# Patient Record
Sex: FEMALE | Race: WHITE | Employment: OTHER | URBAN - METROPOLITAN AREA
[De-identification: names, ages, dates, MRNs, and addresses within clinical notes are randomized per-mention and may not be internally consistent; named-entity substitution may affect disease eponyms.]

---

## 2017-08-29 ENCOUNTER — OFFICE VISIT (OUTPATIENT)
Dept: CARDIOLOGY CLINIC | Age: 72
End: 2017-08-29

## 2017-08-29 VITALS
HEIGHT: 63 IN | SYSTOLIC BLOOD PRESSURE: 110 MMHG | DIASTOLIC BLOOD PRESSURE: 60 MMHG | BODY MASS INDEX: 25.76 KG/M2 | HEART RATE: 56 BPM | WEIGHT: 145.4 LBS

## 2017-08-29 DIAGNOSIS — I25.119 CORONARY ARTERY DISEASE WITH ANGINA PECTORIS, UNSPECIFIED VESSEL OR LESION TYPE, UNSPECIFIED WHETHER NATIVE OR TRANSPLANTED HEART (HCC): ICD-10-CM

## 2017-08-29 DIAGNOSIS — E78.89 LIPIDS ABNORMAL: Primary | ICD-10-CM

## 2017-08-29 RX ORDER — PRAVASTATIN SODIUM 20 MG/1
20 TABLET ORAL
COMMUNITY
End: 2017-08-30 | Stop reason: SDUPTHER

## 2017-08-29 RX ORDER — PRAVASTATIN SODIUM 10 MG/1
10 TABLET ORAL
COMMUNITY
End: 2017-11-15 | Stop reason: DRUGHIGH

## 2017-08-29 NOTE — PATIENT INSTRUCTIONS
Increase Pravachol 30 mg daily    Have blood work drawn in 2 months    Follow up with Doug Vaca in 1 year

## 2017-08-29 NOTE — MR AVS SNAPSHOT
Visit Information Date & Time Provider Department Dept. Phone Encounter #  
 8/29/2017 11:40 AM Julissa Figueroa MD [facility] Fahad Wiggins [phone] [id number] Upcoming Health Maintenance Date Due Hepatitis C Screening 1945 DTaP/Tdap/Td series (1 - Tdap) 8/27/1968 FOBT Q 1 YEAR AGE 50-75 8/27/1997 ZOSTER VACCINE AGE 60> 6/27/2007 GLAUCOMA SCREENING Q2Y 8/27/2012 OSTEOPOROSIS SCREENING (DEXA) 8/27/2012 Pneumococcal 65+ Low/Medium Risk (1 of 2 - PCV13) 8/27/2012 MEDICARE YEARLY EXAM 8/27/2012 INFLUENZA AGE 9 TO ADULT 8/1/2017 BREAST CANCER SCRN MAMMOGRAM 12/13/2018 Allergies as of 8/29/2017  Review Complete On: 8/29/2017 By: Julissa Figueroa MD  
 No Known Allergies Current Immunizations  Never Reviewed No immunizations on file. Not reviewed this visit You Were Diagnosed With   
  
 Codes Comments Lipids abnormal    -  Primary ICD-10-CM: E78.89 ICD-9-CM: 272.9 Coronary artery disease with angina pectoris, unspecified vessel or lesion type, unspecified whether native or transplanted heart [facility])     ICD-10-CM: I25.119 ICD-9-CM: 414.00, 413.9 Vitals BP Pulse Height(growth percentile) Weight(growth percentile) BMI OB Status 110/60 (BP 1 Location: Left arm, BP Patient Position: Sitting) (!) 56 5' 3\" (1.6 m) 145 lb 6.4 oz (66 kg) 25.76 kg/m2 Postmenopausal  
 Smoking Status Former Smoker BMI and BSA Data Body Mass Index Body Surface Area 25.76 kg/m 2 1.71 m 2 Preferred Pharmacy Pharmacy Name Phone [facility], [facility] Nisreen Marquez [phone] Your Updated Medication List  
  
   
This list is accurate as of: 8/29/17  1:12 PM.  Always use your most recent med list.  
  
  
  
  
 ALEVE 220 mg Cap Generic drug:  naproxen sodium Take  by mouth as directed. Citracal + D tablet Generic drug:  calcium citrate-vitamin D3 Take  by mouth daily. CLARITIN 10 mg tablet Generic drug:  loratadine Take 10 mg by mouth daily. FISH OIL 1,000 mg Cap Generic drug:  omega-3 fatty acids-vitamin e Take 1 Cap by mouth.  
  
 multivitamin tablet Commonly known as:  ONE A DAY Take 1 Tab by mouth daily. * pravastatin 20 mg tablet Commonly known as:  PRAVACHOL Take 20 mg by mouth nightly. * pravastatin 10 mg tablet Commonly known as:  PRAVACHOL Take 10 mg by mouth nightly. VESIcare 5 mg tablet Generic drug:  solifenacin Take 5 mg by mouth daily. * Notice: This list has 2 medication(s) that are the same as other medications prescribed for you. Read the directions carefully, and ask your doctor or other care provider to review them with you. We Performed the Following AMB POC EKG ROUTINE W/ 12 LEADS, INTER & REP [17227 CPT(R)] HEPATIC FUNCTION PANEL [96824 CPT(R)] LIPID PANEL [35690 CPT(R)] Patient Instructions Increase Pravachol 30 mg daily Have blood work drawn in 2 months Follow up with Frankie Yang in 1 year Introducing [facility] & HEALTH SERVICES! Victorina Mariee introduces [employer] patient portal. Now you can access parts of your medical record, email your doctor's office, and request medication refills online. 1. In your internet browser, go to [email or web address] 2. Click on the First Time User? Click Here link in the Sign In box. You will see the New Member Sign Up page. 3. Enter your [employer] Access Code exactly as it appears below. You will not need to use this code after youve completed the sign-up process. If you do not sign up before the expiration date, you must request a new code. · [employer] Access Code: [email or web address] Expires: 11/13/2017  8:08 AM 
 
4.  Enter the last four digits of your Social Security Number (xxxx) and Date of Birth (mm/dd/yyyy) as indicated and click Submit. You will be taken to the next sign-up page. 5. Create a [employer] ID. This will be your [employer] login ID and cannot be changed, so think of one that is secure and easy to remember. 6. Create a [employer] password. You can change your password at any time. 7. Enter your Password Reset Question and Answer. This can be used at a later time if you forget your password. 8. Enter your e-mail address. You will receive e-mail notification when new information is available in [address] 9. Click Sign Up. You can now view and download portions of your medical record. 10. Click the Download Summary menu link to download a portable copy of your medical information. If you have questions, please visit the Frequently Asked Questions section of the [employer] website. Remember, [employer] is NOT to be used for urgent needs. For medical emergencies, dial 911. Now available from your iPhone and Android! Please provide this summary of care documentation to your next provider. Your primary care clinician is listed as ADRIANNE Ferreira 49. If you have any questions after today's visit, please call [phone].

## 2017-08-29 NOTE — PROGRESS NOTES
HISTORY OF PRESENT ILLNESS  Ratna Miranda is a 81 y.o. female. She has no h/o HTN, DM she does have bordeline increase in cholesterol. Here for elevated ca score  Ca score on :  Left main: 0  Left anterior descendin  Left circumflex: 0  Right coronary: 0  Posterior descendin    Total calcium score: 128  Stress echo on 10/12 no ischemia or mi and EF at rest 60%  PMH: urinary incontinence  PSH : as above  SH: no tobacco, quit in , occasional etoh, retired  FH: not significant for early CAD  HPI  She is completely asymptomatic and doing well  Review of Systems   Constitutional: Negative. Respiratory: Negative. Cardiovascular: Negative. Physical Exam   Physical Exam   Blood pressure 110/60, pulse (!) 56, height 5' 3\" (1.6 m), weight 66 kg (145 lb 6.4 oz). Constitutional: She is oriented to person, place, and time. She appears well-developed and well-nourished. No distress. HENT: Head: Normocephalic. Eyes: No scleral icterus. Neck: Normal range of motion. Neck supple. No JVD present. No tracheal deviation present. Cardiovascular: Normal rate, regular rhythm, normal heart sounds and intact distal pulses. Exam reveals no gallop and no friction rub. No murmur heard. Pulmonary/Chest: Effort normal and breath sounds normal. No stridor. No respiratory distress, wheezes or  rales. Abdominal: She exhibits no distension. Musculoskeletal: She exhibits no edema. Neurological: She is alert and oriented to person, place, and time. Coordination normal.   Skin: Skin is warm. No rash noted. Not diaphoretic. No erythema. Psychiatric:  Normal mood and affect. Behavior is normal.   Current Outpatient Prescriptions on File Prior to Visit   Medication Sig Dispense Refill    pravastatin (PRAVACHOL) 20 mg tablet TAKE 1 TABLET BY MOUTH NIGHTLY 90 Tab 2    omega-3 fatty acids-vitamin e (FISH OIL) 1,000 mg cap Take 1 Cap by mouth.       loratadine (CLARITIN) 10 mg tablet Take 10 mg by mouth daily.      solifenacin (VESICARE) 5 mg tablet Take 5 mg by mouth daily.  multivitamin (ONE A DAY) tablet Take 1 Tab by mouth daily.  calcium citrate-vitamin D3 (CITRACAL + D) tablet Take  by mouth daily.  naproxen sodium (ALEVE) 220 mg Cap Take  by mouth as directed. No current facility-administered medications on file prior to visit. Lab Results   Component Value Date/Time    Cholesterol, total 166 08/18/2017 08:09 AM    Cholesterol, Total 170 08/18/2017 08:09 AM    HDL Cholesterol 66 08/18/2017 08:09 AM    LDL, calculated 86 08/18/2017 08:09 AM    VLDL, calculated 14 08/18/2017 08:09 AM    Triglyceride 68 08/18/2017 08:09 AM     Results for orders placed or performed in visit on 08/10/17   NMR LIPOPROFILE     Status: Abnormal   Result Value Ref Range Status    LDL-P 1031 (H) <1000 nmol/L Final     Comment:                           Low                   < 1000                            Moderate         1000 - 1299                            Borderline-High  1300 - 1599                            High             1600 - 2000                            Very High             > 2000      LDL-C 89 0 - 99 mg/dL Final     Comment:                           Optimal               <  100                            Above optimal     100 -  129                            Borderline        130 -  159                            High              160 -  189                            Very high             >  189  LDL-C is inaccurate if patient is non-fasting.       HDL-C 66 >39 mg/dL Final    Triglycerides 74 0 - 149 mg/dL Final    Cholesterol, Total 170 100 - 199 mg/dL Final    HDL-P (Total) 42.9 >=30.5 umol/L Final    Small LDL-P 556 (H) <=527 nmol/L Final    LDL size 20.8 >20.5 nm Final     Comment:  ----------------------------------------------------------                   ** INTERPRETATIVE INFORMATION**                   PARTICLE CONCENTRATION AND SIZE                      <--Lower CVD Risk   Higher CVD Risk-->    LDL AND HDL PARTICLES   Percentile in Reference Population    HDL-P (total)        High     75th    50th    25th   Low                         >34.9    34.9    30.5    26.7   <26.7    Small LDL-P          Low      25th    50th    75th   High                         <117     117     527     839    >839    LDL Size   <-Large (Pattern A)->    <-Small (Pattern B)->                      23.0    20.6           20.5      19.0   ----------------------------------------------------------  Small LDL-P and LDL Size are associated with CVD risk, but not after  LDL-P is taken into account. These assays were developed and their performance characteristics  determined by [facility]. These assays have not been cleared by the  [employer] and Drug Administration. The clinical utility o  f these  laboratory values have not been fully established. LP-IR SCORE 32 <=45 Final     Comment: INSULIN RESISTANCE MARKER      <--Insulin Sensitive    Insulin Resistant-->             Percentile in Reference Population  Insulin Resistance Score  LP-IR Score   Low   25th   50th   75th   High                <27   27     45     63     >63  LP-IR Score is inaccurate if patient is non-fasting. The LP-IR score is a laboratory developed index that has been  associated with insulin resistance and diabetes risk and should be  used as one component of a physician's clinical assessment. The  LP-IR score listed above has not been cleared by the [employer] and  Drug Administration. Narrative    Performed at:  [address]  [id number]  : Vini Izaguirre MD, Phone:  [phone]       ASSESSMENT and PLAN  CAD: subclinical by ca score. Her ECg is unchanged with NSR and no significant st t changes. She is completely asymptomatic. No stress test for now, consider stress echo at the next office visit.  Continue same medications  HLD: still with ldl>70 discussed options, increase pravachol to 30 mg daily and obtain lipids and lft in 2 months  have discussed and encouraged weight loss and exercise as well and told her that she needs to exercise at least 3-4 times a week  HTN: well controlled  See her back in 1 year unless of occurring issues

## 2017-08-31 RX ORDER — PRAVASTATIN SODIUM 20 MG/1
20 TABLET ORAL
Qty: 90 TAB | Refills: 3 | Status: SHIPPED | OUTPATIENT
Start: 2017-08-31 | End: 2017-11-15 | Stop reason: DRUGHIGH

## 2017-11-11 LAB
ALBUMIN SERPL-MCNC: 4.2 G/DL (ref 3.5–4.8)
ALP SERPL-CCNC: 68 IU/L (ref 39–117)
ALT SERPL-CCNC: 14 IU/L (ref 0–32)
AST SERPL-CCNC: 20 IU/L (ref 0–40)
BILIRUB DIRECT SERPL-MCNC: 0.19 MG/DL (ref 0–0.4)
BILIRUB SERPL-MCNC: 0.8 MG/DL (ref 0–1.2)
CHOLEST SERPL-MCNC: 164 MG/DL (ref 100–199)
HDLC SERPL-MCNC: 67 MG/DL
INTERPRETATION, 910389: NORMAL
LDLC SERPL CALC-MCNC: 81 MG/DL (ref 0–99)
PROT SERPL-MCNC: 6.9 G/DL (ref 6–8.5)
TRIGL SERPL-MCNC: 78 MG/DL (ref 0–149)
VLDLC SERPL CALC-MCNC: 16 MG/DL (ref 5–40)

## 2017-11-13 ENCOUNTER — TELEPHONE (OUTPATIENT)
Dept: CARDIOLOGY CLINIC | Age: 72
End: 2017-11-13

## 2017-11-13 NOTE — TELEPHONE ENCOUNTER
Verified patient with two patient identifiers. Spoke with patient lab results given. Per Jaimee Messina continue same meds and repeat FLP in 6 months. Will fax a new Rx to [facility]. Per Dr Kj Murcia VO discontinue all medication not taken.

## 2017-11-15 RX ORDER — PRAVASTATIN SODIUM 20 MG/1
20 TABLET ORAL
COMMUNITY
End: 2017-11-15 | Stop reason: SDUPTHER

## 2017-11-15 RX ORDER — PRAVASTATIN SODIUM 20 MG/1
TABLET ORAL
Qty: 135 TAB | Refills: 2 | Status: SHIPPED | OUTPATIENT
Start: 2017-11-15 | End: 2018-02-06 | Stop reason: SDUPTHER

## 2017-12-13 ENCOUNTER — HOSPITAL ENCOUNTER (OUTPATIENT)
Dept: MAMMOGRAPHY | Age: 72
Discharge: HOME | End: 2017-12-13
Payer: COMMERCIAL

## 2017-12-13 DIAGNOSIS — Z12.31 VISIT FOR SCREENING MAMMOGRAM: ICD-10-CM

## 2017-12-13 PROCEDURE — 77067 SCR MAMMO BI INCL CAD: CPT

## 2018-02-06 RX ORDER — PRAVASTATIN SODIUM 20 MG/1
30 TABLET ORAL
Qty: 135 TAB | Refills: 1 | Status: SHIPPED | OUTPATIENT
Start: 2018-02-06 | End: 2018-02-06 | Stop reason: SDUPTHER

## 2018-02-06 RX ORDER — PRAVASTATIN SODIUM 20 MG/1
TABLET ORAL
Qty: 135 TAB | Refills: 3 | Status: SHIPPED | OUTPATIENT
Start: 2018-02-06 | End: 2018-10-03 | Stop reason: DRUGHIGH

## 2018-02-06 NOTE — TELEPHONE ENCOUNTER
Requested Prescriptions     Signed Prescriptions Disp Refills    pravastatin (PRAVACHOL) 20 mg tablet 135 Tab 1     Sig: Take 1.5 Tabs by mouth nightly. 1 1/2 tablets at bedtime     Authorizing Provider: Agustin Pearce     Ordering User: Elise Zamudio     Verbal order per Dr. Osmin Lovett. Yearly follow up visit scheduled on 8-31-18.

## 2018-04-24 ENCOUNTER — HOSPITAL ENCOUNTER (OUTPATIENT)
Dept: ULTRASOUND IMAGING | Age: 73
Discharge: HOME | End: 2018-04-24
Attending: FAMILY MEDICINE
Payer: MEDICARE

## 2018-04-24 DIAGNOSIS — R22.1 NECK MASS: ICD-10-CM

## 2018-04-24 PROCEDURE — 76536 US EXAM OF HEAD AND NECK: CPT

## 2018-05-07 ENCOUNTER — HOSPITAL ENCOUNTER (OUTPATIENT)
Dept: ULTRASOUND IMAGING | Age: 73
Discharge: HOME | End: 2018-05-07
Attending: OTOLARYNGOLOGY
Payer: MEDICARE

## 2018-05-07 DIAGNOSIS — R22.0 INTRACRANIAL SWELLING: ICD-10-CM

## 2018-05-07 PROCEDURE — 10022 US GUIDE FINE NDL ASP W IMAGE: CPT

## 2018-05-07 PROCEDURE — 88173 CYTOPATH EVAL FNA REPORT: CPT | Performed by: OTOLARYNGOLOGY

## 2018-05-07 PROCEDURE — 88172 CYTP DX EVAL FNA 1ST EA SITE: CPT | Performed by: OTOLARYNGOLOGY

## 2018-08-10 ENCOUNTER — HOSPITAL ENCOUNTER (OUTPATIENT)
Dept: GENERAL RADIOLOGY | Age: 73
Discharge: HOME | End: 2018-08-10
Payer: MEDICARE

## 2018-08-10 DIAGNOSIS — Z87.891 PERSONAL HISTORY OF TOBACCO USE, PRESENTING HAZARDS TO HEALTH: ICD-10-CM

## 2018-08-10 PROCEDURE — 71046 X-RAY EXAM CHEST 2 VIEWS: CPT

## 2018-09-12 ENCOUNTER — TELEPHONE (OUTPATIENT)
Dept: CARDIOLOGY CLINIC | Age: 73
End: 2018-09-12

## 2018-09-12 DIAGNOSIS — E78.89 LIPIDS ABNORMAL: ICD-10-CM

## 2018-09-12 DIAGNOSIS — I25.119 CORONARY ARTERY DISEASE WITH ANGINA PECTORIS, UNSPECIFIED VESSEL OR LESION TYPE, UNSPECIFIED WHETHER NATIVE OR TRANSPLANTED HEART (HCC): Primary | ICD-10-CM

## 2018-09-12 NOTE — TELEPHONE ENCOUNTER
Verified patient with two patient identifiers. Spoke with patient,she said that she will have it done sometime next week. Lab slip faxed to [phone].

## 2018-09-12 NOTE — TELEPHONE ENCOUNTER
Pt called requesting an lab order be faxed to Pam Russo in prep for her to get her labs drawn.   Fax # [phone]  Phone # [phone]  Thanks

## 2018-09-21 ENCOUNTER — HOSPITAL ENCOUNTER (OUTPATIENT)
Dept: LAB | Age: 73
Discharge: HOME | End: 2018-09-21
Payer: MEDICARE

## 2018-09-21 PROCEDURE — 80076 HEPATIC FUNCTION PANEL: CPT

## 2018-09-21 PROCEDURE — 80061 LIPID PANEL: CPT

## 2018-09-21 PROCEDURE — 36415 COLL VENOUS BLD VENIPUNCTURE: CPT

## 2018-09-22 LAB
ALBUMIN SERPL-MCNC: 3.9 G/DL (ref 3.5–4.8)
ALP SERPL-CCNC: 60 IU/L (ref 39–117)
ALT SERPL-CCNC: 17 IU/L (ref 0–32)
AST SERPL-CCNC: 18 IU/L (ref 0–40)
BILIRUB DIRECT SERPL-MCNC: 0.18 MG/DL (ref 0–0.4)
BILIRUB SERPL-MCNC: 0.7 MG/DL (ref 0–1.2)
CHOLEST SERPL-MCNC: 158 MG/DL (ref 100–199)
HDLC SERPL-MCNC: 60 MG/DL
INTERPRETATION, 910389: NORMAL
LDLC SERPL CALC-MCNC: 79 MG/DL (ref 0–99)
PROT SERPL-MCNC: 6.6 G/DL (ref 6–8.5)
TRIGL SERPL-MCNC: 94 MG/DL (ref 0–149)
VLDLC SERPL CALC-MCNC: 19 MG/DL (ref 5–40)

## 2018-10-03 ENCOUNTER — OFFICE VISIT (OUTPATIENT)
Dept: CARDIOLOGY CLINIC | Age: 73
End: 2018-10-03

## 2018-10-03 VITALS
HEIGHT: 63 IN | HEART RATE: 64 BPM | SYSTOLIC BLOOD PRESSURE: 140 MMHG | BODY MASS INDEX: 26.4 KG/M2 | DIASTOLIC BLOOD PRESSURE: 84 MMHG | WEIGHT: 149 LBS

## 2018-10-03 DIAGNOSIS — E78.89 LIPIDS ABNORMAL: Primary | ICD-10-CM

## 2018-10-03 DIAGNOSIS — I25.119 CORONARY ARTERY DISEASE WITH ANGINA PECTORIS, UNSPECIFIED VESSEL OR LESION TYPE, UNSPECIFIED WHETHER NATIVE OR TRANSPLANTED HEART (HCC): ICD-10-CM

## 2018-10-03 RX ORDER — PRAVASTATIN SODIUM 40 MG/1
40 TABLET ORAL
Qty: 90 TAB | Refills: 3 | Status: SHIPPED | OUTPATIENT
Start: 2018-10-03 | End: 2019-09-17 | Stop reason: SDUPTHER

## 2018-10-03 RX ORDER — PRAVASTATIN SODIUM 40 MG/1
40 TABLET ORAL
COMMUNITY
End: 2018-10-03 | Stop reason: SDUPTHER

## 2018-10-03 NOTE — MR AVS SNAPSHOT
[address] [facility] [address] [facility] 
[phone] Patient: Chilo Hendrickson MRN: [id number] :1945 Visit Information Date & Time Provider Department Dept. Phone Encounter #  
 10/3/2018 11:20 AM Amado Villarreal MD [facility] Gabi Powell [phone] [id number] Upcoming Health Maintenance Date Due Hepatitis C Screening 1945 DTaP/Tdap/Td series (1 - Tdap) 1968 Shingrix Vaccine Age 50> (1 of 2) 1997 FOBT Q 1 YEAR AGE 50-75 1997 GLAUCOMA SCREENING Q2Y 2012 Bone Densitometry (Dexa) Screening 2012 Pneumococcal 65+ Low/Medium Risk (1 of 2 - PCV13) 2012 MEDICARE YEARLY EXAM 2018 Influenza Age 5 to Adult 2018 BREAST CANCER SCRN MAMMOGRAM 2019 Allergies as of 10/3/2018  Review Complete On: 10/3/2018 By: Amado Villarreal MD  
 No Known Allergies Current Immunizations  Never Reviewed No immunizations on file. Not reviewed this visit You Were Diagnosed With   
  
 Codes Comments Lipids abnormal    -  Primary ICD-10-CM: E78.89 ICD-9-CM: 272.9 Coronary artery disease with angina pectoris, unspecified vessel or lesion type, unspecified whether native or transplanted heart [facility])     ICD-10-CM: I25.119 ICD-9-CM: 414.00, 413.9 Vitals BP Pulse Height(growth percentile) Weight(growth percentile) BMI OB Status 140/84 (BP 1 Location: Left arm, BP Patient Position: Sitting) 64 5' 3\" (1.6 m) 149 lb (67.6 kg) 26.39 kg/m2 Postmenopausal  
 Smoking Status Former Smoker Vitals History BMI and BSA Data Body Mass Index Body Surface Area  
 26.39 kg/m 2 1.73 m 2 Preferred Pharmacy Pharmacy Name Phone  [address] [phone] Your Updated Medication List  
  
   
 This list is accurate as of 10/3/18 12:24 PM.  Always use your most recent med list.  
  
  
  
  
 ALEVE 220 mg Cap Generic drug:  naproxen sodium Take  by mouth as directed. Citracal + D tablet Generic drug:  calcium citrate-vitamin D3 Take  by mouth daily. CLARITIN 10 mg tablet Generic drug:  loratadine Take 10 mg by mouth daily. FISH OIL 1,000 mg Cap Generic drug:  omega-3 fatty acids-vitamin e Take 1 Cap by mouth.  
  
 multivitamin tablet Commonly known as:  ONE A DAY Take 1 Tab by mouth daily. PRAVACHOL 40 mg tablet Generic drug:  pravastatin Take 40 mg by mouth nightly. VESIcare 5 mg tablet Generic drug:  solifenacin Take 5 mg by mouth daily. We Performed the Following AMB POC EKG ROUTINE W/ 12 LEADS, INTER & REP [58552 CPT(R)] HEPATIC FUNCTION PANEL [90582 CPT(R)] LIPID PANEL [51189 CPT(R)] Patient Instructions Increase Pravachol 40 mg daily Have blood work drawn in 3 months Follow up with Sumanth Moore in 1 year Introducing \[facility]\"" & HEALTH SERVICES! Dear Loir Leavitt: Thank you for requesting a [employer] account. Our records indicate that you already have an active [employer] account. You can access your account anytime at [email or web address] Did you know that you can access your hospital and ER discharge instructions at any time in [employer]? You can also review all of your test results from your hospital stay or ER visit. Additional Information If you have questions, please visit the Frequently Asked Questions section of the [employer] website at [email or web address] Remember, [employer] is NOT to be used for urgent needs. For medical emergencies, dial 911. Now available from your iPhone and Android! Please provide this summary of care documentation to your next provider. Your primary care clinician is listed as ADRIANNE Ferreira 47.  If you have any questions after today's visit, please call [phone].

## 2018-10-03 NOTE — PROGRESS NOTES
Chief Complaint   Patient presents with    Coronary Artery Disease    Annual Exam     Verified patient with two types of identifiers. Verified medications with the patient.     Verified patient's pharmacy

## 2018-10-03 NOTE — PATIENT INSTRUCTIONS
Increase Pravachol 40 mg daily    Have blood work drawn in 3 months    Follow up with Tobias Nowak in 1 year

## 2018-10-03 NOTE — PROGRESS NOTES
HISTORY OF PRESENT ILLNESS  Roseann Kang is a 72 y.o. female. She has no h/o HTN, DM she does have bordeline increase in cholesterol. Here for elevated ca score  Ca score on :  Left main: 0  Left anterior descendin  Left circumflex: 0  Right coronary: 0  Posterior descendin    Total calcium score: 128  Stress echo on 10/12 no ischemia or mi and EF at rest 60%  PMH: urinary incontinence  PSH : as above  SH: no tobacco, quit in [address] [place] [address], occasional etoh, retired  FH: not significant for early CAD  HPI  Doing well no complaints at all very active with no issues  Review of Systems   Respiratory: Negative. Cardiovascular: Negative. Physical Exam   Physical Exam   Blood pressure 140/84, pulse 64, height 5' 3\" (1.6 m), weight 67.6 kg (149 lb). Constitutional: She is oriented to person, place, and time. She appears well-developed and well-nourished. No distress. HENT: Head: Normocephalic. Eyes: No scleral icterus. Neck: Normal range of motion. Neck supple. No JVD present. No tracheal deviation present. Cardiovascular: Normal rate, regular rhythm, normal heart sounds and intact distal pulses. Exam reveals no gallop and no friction rub. No murmur heard. Pulmonary/Chest: Effort normal and breath sounds normal. No stridor. No respiratory distress, wheezes or  rales. Abdominal: She exhibits no distension. Musculoskeletal: She exhibits no edema. Neurological: She is alert and oriented to person, place, and time. Coordination normal.   Skin: Skin is warm. No rash noted. Not diaphoretic. No erythema. Psychiatric:  Normal mood and affect. Behavior is normal.   Current Outpatient Prescriptions on File Prior to Visit   Medication Sig Dispense Refill    loratadine (CLARITIN) 10 mg tablet Take 10 mg by mouth daily.  solifenacin (VESICARE) 5 mg tablet Take 5 mg by mouth daily.  multivitamin (ONE A DAY) tablet Take 1 Tab by mouth daily.         calcium citrate-vitamin D3 (CITRACAL + D) tablet Take  by mouth daily.  naproxen sodium (ALEVE) 220 mg Cap Take  by mouth as directed.  omega-3 fatty acids-vitamin e (FISH OIL) 1,000 mg cap Take 1 Cap by mouth. No current facility-administered medications on file prior to visit. Lab Results   Component Value Date/Time    Cholesterol, total 158 09/21/2018 08:09 AM    HDL Cholesterol 60 09/21/2018 08:09 AM    LDL, calculated 79 09/21/2018 08:09 AM    VLDL, calculated 19 09/21/2018 08:09 AM    Triglyceride 94 09/21/2018 08:09 AM     Lab Results   Component Value Date/Time    ALT (SGPT) 17 09/21/2018 08:09 AM    AST (SGOT) 18 09/21/2018 08:09 AM    Alk. phosphatase 60 09/21/2018 08:09 AM    Bilirubin, direct 0.18 09/21/2018 08:09 AM    Bilirubin, total 0.7 09/21/2018 08:09 AM       ASSESSMENT and PLAN  CAD: subclinical by ca score. Her ECg is unchanged with NSR and no significant st t changes. She is completely asymptomatic.  No stress test for now, consider repeating ca score at the next OV  Continue pravachol and asa    HLD: still with ldl>70 discussed options, increase pravachol to 40 mg daily and obtain lipids and lft in 3 months  have discussed and encouraged weight loss and exercise as well and told her that she needs to exercise at least 3-4 times a week    HTN: upper limits of normal she will keep an eye on it at home and let me know if >140    See her back in 1 year unless of occurring issues

## 2018-10-29 RX ORDER — OXYBUTYNIN CHLORIDE 5 MG/1
5 TABLET, EXTENDED RELEASE ORAL DAILY
COMMUNITY

## 2018-10-30 ENCOUNTER — HOSPITAL ENCOUNTER (OUTPATIENT)
Age: 73
Setting detail: OUTPATIENT SURGERY
Discharge: HOME | End: 2018-10-30
Attending: INTERNAL MEDICINE | Admitting: INTERNAL MEDICINE
Payer: MEDICARE

## 2018-10-30 ENCOUNTER — ANESTHESIA EVENT (OUTPATIENT)
Dept: ENDOSCOPY | Age: 73
End: 2018-10-30
Payer: MEDICARE

## 2018-10-30 ENCOUNTER — ANESTHESIA (OUTPATIENT)
Dept: ENDOSCOPY | Age: 73
End: 2018-10-30
Payer: MEDICARE

## 2018-10-30 VITALS
SYSTOLIC BLOOD PRESSURE: 114 MMHG | DIASTOLIC BLOOD PRESSURE: 73 MMHG | HEIGHT: 63 IN | HEART RATE: 77 BPM | WEIGHT: 147.5 LBS | OXYGEN SATURATION: 94 % | RESPIRATION RATE: 11 BRPM | TEMPERATURE: 97.8 F | BODY MASS INDEX: 26.13 KG/M2

## 2018-10-30 PROCEDURE — 74011250636 HC RX REV CODE- 250/636: Performed by: INTERNAL MEDICINE

## 2018-10-30 PROCEDURE — 76040000019: Performed by: INTERNAL MEDICINE

## 2018-10-30 PROCEDURE — 74011000250 HC RX REV CODE- 250

## 2018-10-30 PROCEDURE — 77030013992 HC SNR POLYP ENDOSC BSC -B: Performed by: INTERNAL MEDICINE

## 2018-10-30 PROCEDURE — 88305 TISSUE EXAM BY PATHOLOGIST: CPT | Performed by: INTERNAL MEDICINE

## 2018-10-30 PROCEDURE — 74011250636 HC RX REV CODE- 250/636

## 2018-10-30 PROCEDURE — 76060000031 HC ANESTHESIA FIRST 0.5 HR: Performed by: INTERNAL MEDICINE

## 2018-10-30 RX ORDER — NALOXONE HYDROCHLORIDE 0.4 MG/ML
0.4 INJECTION, SOLUTION INTRAMUSCULAR; INTRAVENOUS; SUBCUTANEOUS
Status: DISCONTINUED | OUTPATIENT
Start: 2018-10-30 | End: 2018-10-30 | Stop reason: HOSPADM

## 2018-10-30 RX ORDER — SODIUM CHLORIDE 0.9 % (FLUSH) 0.9 %
5-10 SYRINGE (ML) INJECTION AS NEEDED
Status: DISCONTINUED | OUTPATIENT
Start: 2018-10-30 | End: 2018-10-30 | Stop reason: HOSPADM

## 2018-10-30 RX ORDER — ATROPINE SULFATE 0.1 MG/ML
0.5 INJECTION INTRAVENOUS
Status: DISCONTINUED | OUTPATIENT
Start: 2018-10-30 | End: 2018-10-30 | Stop reason: HOSPADM

## 2018-10-30 RX ORDER — DEXTROMETHORPHAN/PSEUDOEPHED 2.5-7.5/.8
1.2 DROPS ORAL
Status: DISCONTINUED | OUTPATIENT
Start: 2018-10-30 | End: 2018-10-30 | Stop reason: HOSPADM

## 2018-10-30 RX ORDER — SODIUM CHLORIDE 9 MG/ML
100 INJECTION, SOLUTION INTRAVENOUS CONTINUOUS
Status: DISCONTINUED | OUTPATIENT
Start: 2018-10-30 | End: 2018-10-30 | Stop reason: HOSPADM

## 2018-10-30 RX ORDER — EPINEPHRINE 0.1 MG/ML
1 INJECTION INTRACARDIAC; INTRAVENOUS
Status: DISCONTINUED | OUTPATIENT
Start: 2018-10-30 | End: 2018-10-30 | Stop reason: HOSPADM

## 2018-10-30 RX ORDER — FENTANYL CITRATE 50 UG/ML
50 INJECTION, SOLUTION INTRAMUSCULAR; INTRAVENOUS
Status: DISCONTINUED | OUTPATIENT
Start: 2018-10-30 | End: 2018-10-30 | Stop reason: HOSPADM

## 2018-10-30 RX ORDER — GLYCOPYRROLATE 0.2 MG/ML
INJECTION INTRAMUSCULAR; INTRAVENOUS AS NEEDED
Status: DISCONTINUED | OUTPATIENT
Start: 2018-10-30 | End: 2018-10-30 | Stop reason: HOSPADM

## 2018-10-30 RX ORDER — PROPOFOL 10 MG/ML
INJECTION, EMULSION INTRAVENOUS AS NEEDED
Status: DISCONTINUED | OUTPATIENT
Start: 2018-10-30 | End: 2018-10-30 | Stop reason: HOSPADM

## 2018-10-30 RX ORDER — SODIUM CHLORIDE 0.9 % (FLUSH) 0.9 %
5-10 SYRINGE (ML) INJECTION EVERY 8 HOURS
Status: DISCONTINUED | OUTPATIENT
Start: 2018-10-30 | End: 2018-10-30 | Stop reason: HOSPADM

## 2018-10-30 RX ORDER — FLUMAZENIL 0.1 MG/ML
0.2 INJECTION INTRAVENOUS
Status: DISCONTINUED | OUTPATIENT
Start: 2018-10-30 | End: 2018-10-30 | Stop reason: HOSPADM

## 2018-10-30 RX ORDER — MIDAZOLAM HYDROCHLORIDE 1 MG/ML
.25-5 INJECTION, SOLUTION INTRAMUSCULAR; INTRAVENOUS
Status: DISCONTINUED | OUTPATIENT
Start: 2018-10-30 | End: 2018-10-30 | Stop reason: HOSPADM

## 2018-10-30 RX ADMIN — GLYCOPYRROLATE 0.2 MG: 0.2 INJECTION INTRAMUSCULAR; INTRAVENOUS at 08:07

## 2018-10-30 RX ADMIN — SODIUM CHLORIDE 100 ML/HR: 900 INJECTION, SOLUTION INTRAVENOUS at 07:58

## 2018-10-30 RX ADMIN — PROPOFOL 160 MG: 10 INJECTION, EMULSION INTRAVENOUS at 08:15

## 2018-10-30 NOTE — PROCEDURES
[facility]                  Colonoscopy Operative Report    10/30/2018      Taran Erazo  [id number]  1945    Procedure Type:   Colonoscopy --screening     Indications:    Family history of coloretal cancer (screening only)     Pre-operative Diagnosis: see indication above    Post-operative Diagnosis:  See findings below    :  Jackson [place] MD    Referring Provider: Linda Rodriguez MD      Sedation:  MAC anesthesia Propofol    Pre-Procedural Exam:      Airway: clear,  No airway problems anticipated  Heart: RRR, without gallops or rubs  Lungs: clear bilaterally without wheezes, crackles, or rhonchi  Abdomen: soft, nontender, nondistended, bowel sounds present  Mental Status: awake, alert and oriented to person, place and time     Procedure Details:  After informed consent was obtained with all risks and benefits of procedure explained and preoperative exam completed, the patient was taken to the endoscopy suite and placed in the left lateral decubitus position. Upon sequential sedation as per above, a digital rectal exam was performed . The Olympus videocolonoscope  was inserted in the rectum and carefully advanced to the cecum, which was identified by the ileocecal valve and appendiceal orifice. The cecum was identified by the ileocecal valve and appendiceal orifice. The quality of preparation was good. The colonoscope was slowly withdrawn with careful evaluation between folds. Retroflexion in the rectum was completed demonstrating internal hemorrhoids. Findings:   Rectum: Grade 1 internal hemorrhoid(s); Sigmoid:     - Diverticulosis  5 mm polyp, cold snared off  Descending Colon: normal  Transverse Colon: normal  Ascending Colon: normal  Cecum: normal  Terminal Ileum: not intubated      Specimen Removed:  sigmoid polyp    Complications: None. EBL:  None.     Impression:    Small sigmoid polyp removed; internal hemorrhoids and diverticulosis    Recommendations: --Await pathology. , -Repeat colonoscopy in 5 years. High fiber diet. Resume normal medication(s). Discharge Disposition:  Home in the company of a  when able to ambulate. Lou Banegas MD    10/30/2018     JASON Sargent MD  [facility], [address] [place] [address]  [phone]  [email or web address]

## 2018-10-30 NOTE — ANESTHESIA POSTPROCEDURE EVALUATION
Procedure(s): 
COLONOSCOPY 
ENDOSCOPIC POLYPECTOMY. Anesthesia Post Evaluation Patient location during evaluation: PACU Note status: Adequate. Level of consciousness: responsive to verbal stimuli and sleepy but conscious Pain management: satisfactory to patient Airway patency: patent Anesthetic complications: no 
Cardiovascular status: acceptable Respiratory status: acceptable Hydration status: acceptable Comments: +Post-Anesthesia Evaluation and Assessment Patient: Julia Virk MRN: [id number]  SSN: [id number] YOB: 1945  Age: 72 y.o. Sex: female Cardiovascular Function/Vital Signs /73   Pulse 77   Temp 36.6 °C (97.8 °F)   Resp 11   Ht 5' 3\" (1.6 m)   Wt 66.9 kg (147 lb 8 oz)   SpO2 94%   Breastfeeding? No   BMI 26.13 kg/m² Patient is status post Procedure(s): 
COLONOSCOPY 
ENDOSCOPIC POLYPECTOMY. Nausea/Vomiting: Controlled. Postoperative hydration reviewed and adequate. Pain: 
Pain Scale 1: Numeric (0 - 10) (10/30/18 5185) Pain Intensity 1: 0 (10/30/18 1433) Managed. Neurological Status: At baseline. Mental Status and Level of Consciousness: Arousable. Pulmonary Status:  
O2 Device: Room air (10/30/18 6300) Adequate oxygenation and airway patent. Complications related to anesthesia: None Post-anesthesia assessment completed. No concerns. Signed By: Zandra Farias MD  
 10/30/2018 Visit Vitals /73 Pulse 77 Temp 36.6 °C (97.8 °F) Resp 11 Ht 5' 3\" (1.6 m) Wt 66.9 kg (147 lb 8 oz) SpO2 94% Breastfeeding? No  
BMI 26.13 kg/m²

## 2018-10-30 NOTE — DISCHARGE INSTRUCTIONS
Savana Sharpe MD  [facility], [address] [place] [address], [address] [facility]  [phone]  [email or web address]    Chilo Hendrickson  [id number]  1945    COLON DISCHARGE INSTRUCTIONS  Discomfort:  Redness at IV site- apply warm compress to area; if redness or soreness persist- contact your physician  There may be a slight amount of blood passed from the rectum  Gaseous discomfort- walking, belching will help relieve any discomfort  You may not operate a vehicle for 12 hours  You may not engage in an occupation involving machinery or appliances for rest of today  You may not drink alcoholic beverages for at least 12 hours  Avoid making any critical decisions for at least 24 hour  DIET:   High fiber diet. - however -  remember your colon is empty and a heavy meal will produce gas. Avoid these foods:  vegetables, fried / greasy foods, carbonated drinks for today      ACTIVITY:  You may resume your normal daily activities it is recommended that you spend the remainder of the day resting -  avoid any strenuous activity. CALL M.D. ANY SIGN OF:   Increasing pain, nausea, vomiting  Abdominal distension (swelling)  New increased bleeding (oral or rectal)  Fever (chills)  Pain in chest area  Bloody discharge from nose or mouth  Shortness of breath     COLONOSCOPY FINDINGS:  Your colonoscopy showed: one small polyp which was removed; internal hemorrhoids and diverticulosis. Follow-up Instructions:   Call Dr. Savana Sharpe if any questions or problems. Telephone # [phone]  Dr. Omi Frankel office will notify you of the biopsy results within 7 to 10 days. Should have a repeat colonoscopy in 5 years.

## 2018-10-30 NOTE — ANESTHESIA PREPROCEDURE EVALUATION
Anesthetic History No history of anesthetic complications Review of Systems / Medical History Patient summary reviewed, nursing notes reviewed and pertinent labs reviewed Pulmonary Within defined limits Neuro/Psych Within defined limits Cardiovascular Within defined limits CAD Exercise tolerance: >4 METS 
  
GI/Hepatic/Renal 
Within defined limits Endo/Other Within defined limits Hypothyroidism Other Findings Physical Exam 
 
Airway Mallampati: II 
TM Distance: 4 - 6 cm Neck ROM: normal range of motion Mouth opening: Normal 
 
 Cardiovascular Regular rate and rhythm,  S1 and S2 normal,  no murmur, click, rub, or gallop Dental 
No notable dental hx Pulmonary Breath sounds clear to auscultation Abdominal 
GI exam deferred Other Findings Anesthetic Plan ASA: 2 Anesthesia type: general and total IV anesthesia Induction: Intravenous Anesthetic plan and risks discussed with: Patient Propofol MAC

## 2018-10-30 NOTE — H&P
Oswald Friedman MD 
[facility], [address] [place] [address], [address] [place], [address] [place] [address] 
[phone] 
[email or web address] Out[facility] History and Physical 
 
Patient: Maggie Wilks Physician: Beatrice Alegre MD 
 
Vital Signs: Blood pressure 145/73, pulse 62, temperature 97.8 °F (36.6 °C), resp. rate 16, height 5' 3\" (1.6 m), weight 66.9 kg (147 lb 8 oz), SpO2 97 %, not currently breastfeeding. Allergies: No Known Allergies Chief Complaint: Screening colonoscopy History of Present Illness: Here for a screening colonoscopy. Last colonoscopy was  and showed no polyps. Currently has no GI symptoms. No FH of colon cancer or polyps. History: 
Past Medical History:  
Diagnosis Date  High cholesterol  Ill-defined condition Over active bladder  Thyroid disease   
 cyst, biopsy okay Past Surgical History:  
Procedure Laterality Date  HX COLONOSCOPY    
 HX OTHER SURGICAL    
 thyroid cyst, biopsy  GERMANIA BIOPSY BREAST STEREOTACTIC Left   
 benign bx Social History Socioeconomic History  Marital status:  Spouse name: Not on file  Number of children: Not on file  Years of education: Not on file  Highest education level: Not on file Social Needs  Financial resource strain: Not on file  Food insecurity - worry: Not on file  Food insecurity - inability: Not on file  Transportation needs - medical: Not on file  Transportation needs - non-medical: Not on file Occupational History  Not on file Tobacco Use  Smoking status: Former Smoker Packs/day: 1.00 Years: 10.00 Pack years: 10.00 Types: Cigarettes Last attempt to quit: 1999 Years since quittin.5  Smokeless tobacco: Never Used Substance and Sexual Activity  Alcohol use: Yes Alcohol/week: 0.0 oz  
  Comment: one glass wine a week  Drug use:  No  
  Sexual activity: Not on file Other Topics Concern  Not on file Social History Narrative  Not on file Family History Problem Relation Age of Onset  Breast Cancer Paternal Grandmother  Stroke Mother  Colon Cancer Father  Hypertension Sister  COPD Sister  High Cholesterol Sister Patient Active Problem List  
Diagnosis Code  Lipids abnormal E78.89  
 CAD (coronary artery disease) I25.10 Medications:  
Prior to Admission medications Medication Sig Start Date End Date Taking? Authorizing Provider  
oxybutynin chloride XL (DITROPAN XL) 5 mg CR tablet Take 5 mg by mouth daily. Yes Provider, Historical  
multivitamin with minerals (HAIR,SKIN AND NAILS PO) Take  by mouth daily. Yes Provider, Historical  
pravastatin (PRAVACHOL) 40 mg tablet Take 1 Tab by mouth nightly. 10/3/18  Yes Blas Kevin MD  
loratadine (CLARITIN) 10 mg tablet Take 10 mg by mouth daily. Yes Provider, Historical  
multivitamin (ONE A DAY) tablet Take 1 Tab by mouth daily. Yes Provider, Historical  
calcium citrate-vitamin D3 (CITRACAL + D) tablet Take  by mouth daily. Yes Provider, Historical  
naproxen sodium (ALEVE) 220 mg Cap Take  by mouth as directed. Yes Provider, Historical  
 
 
Physical Exam:  
 
General: well developed, well nourished HEENT: unremarkable Heart: regular rhythm no mumur Lungs: clear Abdominal:  benign Neurological: unremarkable Extremities: no edema Findings/Diagnosis: Screening colonoscopy Plan of Care/Planned Procedure: Colonoscopy with monitored anesthesia care sedation Signed: 
Judy Ortiz MD 10/30/2018

## 2018-10-30 NOTE — ROUTINE PROCESS
Tessa Adry 1945 
[id number] Situation: 
Verbal report received from: Gerrynitesh November Procedure: Procedure(s): 
COLONOSCOPY 
ENDOSCOPIC POLYPECTOMY Background: 
 
Preoperative diagnosis: FAMILY HISTORY OF COLON CANCER Postoperative diagnosis: Diverticulosis, Colon Polyp, Hemorrhoids :  Dr. Chuck West Assistant(s): Endoscopy Technician-1: Carmen Marshall Endoscopy RN-1: Germán Devine RN Specimens:  
ID Type Source Tests Collected by Time Destination 1 : Sigmoid Colon Polyp Preservative Sigmoid  [facility] MD 10/30/2018 3112 Pathology H. Pylori  no Assessment: 
Intra-procedure medications Anesthesia gave intra-procedure sedation and medications, see anesthesia flow sheet yes Intravenous fluids: NS@ Leanora Peyer Vital signs stable Abdominal assessment: round and soft Recommendation: 
Discharge patient per MD order. Family or Micky Krystyna,  Permission to share finding with family or friend yes

## 2018-12-18 ENCOUNTER — HOSPITAL ENCOUNTER (OUTPATIENT)
Dept: MAMMOGRAPHY | Age: 73
Discharge: HOME | End: 2018-12-18
Payer: MEDICARE

## 2018-12-18 DIAGNOSIS — Z12.39 BREAST SCREENING: ICD-10-CM

## 2018-12-18 PROCEDURE — 77067 SCR MAMMO BI INCL CAD: CPT

## 2018-12-27 ENCOUNTER — HOSPITAL ENCOUNTER (OUTPATIENT)
Dept: ULTRASOUND IMAGING | Age: 73
Discharge: HOME | End: 2018-12-27
Attending: OTOLARYNGOLOGY
Payer: MEDICARE

## 2018-12-27 DIAGNOSIS — R22.1 NECK MASS: ICD-10-CM

## 2018-12-27 PROCEDURE — 76536 US EXAM OF HEAD AND NECK: CPT

## 2019-01-10 ENCOUNTER — HOSPITAL ENCOUNTER (OUTPATIENT)
Dept: LAB | Age: 74
Discharge: HOME | End: 2019-01-10
Payer: MEDICARE

## 2019-01-10 PROCEDURE — 36415 COLL VENOUS BLD VENIPUNCTURE: CPT

## 2019-01-10 PROCEDURE — 80061 LIPID PANEL: CPT

## 2019-01-10 PROCEDURE — 80076 HEPATIC FUNCTION PANEL: CPT

## 2019-01-11 LAB
ALBUMIN SERPL-MCNC: 4.4 G/DL (ref 3.5–4.8)
ALP SERPL-CCNC: 59 IU/L (ref 39–117)
ALT SERPL-CCNC: 18 IU/L (ref 0–32)
AST SERPL-CCNC: 21 IU/L (ref 0–40)
BILIRUB DIRECT SERPL-MCNC: 0.2 MG/DL (ref 0–0.4)
BILIRUB SERPL-MCNC: 0.8 MG/DL (ref 0–1.2)
CHOLEST SERPL-MCNC: 150 MG/DL (ref 100–199)
HDLC SERPL-MCNC: 60 MG/DL
INTERPRETATION, 910389: NORMAL
LDLC SERPL CALC-MCNC: 70 MG/DL (ref 0–99)
PROT SERPL-MCNC: 7.2 G/DL (ref 6–8.5)
TRIGL SERPL-MCNC: 101 MG/DL (ref 0–149)
VLDLC SERPL CALC-MCNC: 20 MG/DL (ref 5–40)

## 2019-09-17 RX ORDER — PRAVASTATIN SODIUM 40 MG/1
TABLET ORAL
Qty: 90 TAB | Refills: 3 | Status: SHIPPED | OUTPATIENT
Start: 2019-09-17 | End: 2019-10-01 | Stop reason: DRUGHIGH

## 2019-09-19 ENCOUNTER — HOSPITAL ENCOUNTER (OUTPATIENT)
Dept: LAB | Age: 74
Discharge: HOME | End: 2019-09-19
Payer: MEDICARE

## 2019-09-19 PROCEDURE — 80061 LIPID PANEL: CPT

## 2019-09-19 PROCEDURE — 36415 COLL VENOUS BLD VENIPUNCTURE: CPT

## 2019-09-19 PROCEDURE — 80076 HEPATIC FUNCTION PANEL: CPT

## 2019-10-01 ENCOUNTER — OFFICE VISIT (OUTPATIENT)
Dept: CARDIOLOGY CLINIC | Age: 74
End: 2019-10-01

## 2019-10-01 VITALS
BODY MASS INDEX: 26.22 KG/M2 | HEIGHT: 63 IN | OXYGEN SATURATION: 98 % | HEART RATE: 64 BPM | SYSTOLIC BLOOD PRESSURE: 110 MMHG | WEIGHT: 148 LBS | DIASTOLIC BLOOD PRESSURE: 60 MMHG | RESPIRATION RATE: 16 BRPM

## 2019-10-01 DIAGNOSIS — I25.119 CORONARY ARTERY DISEASE WITH ANGINA PECTORIS, UNSPECIFIED VESSEL OR LESION TYPE, UNSPECIFIED WHETHER NATIVE OR TRANSPLANTED HEART (HCC): ICD-10-CM

## 2019-10-01 DIAGNOSIS — E78.89 LIPIDS ABNORMAL: ICD-10-CM

## 2019-10-01 RX ORDER — PRAVASTATIN SODIUM 80 MG/1
80 TABLET ORAL
COMMUNITY
End: 2019-12-02 | Stop reason: SDUPTHER

## 2019-10-01 NOTE — PATIENT INSTRUCTIONS
Stress Echo (Will call result)    Increase Pravachol 80 mg daily    Have blood work drawn in 6-8 weeks    Follow up with Urmila Blind in 1 year

## 2019-10-01 NOTE — PROGRESS NOTES
HISTORY OF PRESENT ILLNESS  Radha Carnes is a 69 y.o. female. She has no h/o HTN, DM she does have bordeline increase in cholesterol. Seen Here for elevated ca score  Ca score on :  Left main: 0  Left anterior descendin  Left circumflex: 0  Right coronary: 0  Posterior descendin    Total calcium score: 128  Stress echo on 10/12 no ischemia or mi and EF at rest 60%  PMH: urinary incontinence  PSH : as above  SH: no tobacco, quit in [address] [place] [address], occasional etoh, retired  FH: not significant for early CAD  HPI  No clear symptoms  No cp or sob reported   no palpitations  Review of Systems   Constitutional: Negative. Respiratory: Negative. Cardiovascular: Negative. Physical Exam   Physical Exam   Blood pressure 110/60, pulse 64, resp. rate 16, height 5' 3\" (1.6 m), weight 148 lb (67.1 kg), SpO2 98 %. Constitutional: She is oriented to person, place, and time. She appears well-developed and well-nourished. No distress. HENT: Head: Normocephalic. Eyes: No scleral icterus. Neck: Normal range of motion. Neck supple. No JVD present. No tracheal deviation present. Cardiovascular: Normal rate, regular rhythm, normal heart sounds and intact distal pulses. Exam reveals no gallop and no friction rub. No murmur heard. Pulmonary/Chest: Effort normal and breath sounds normal. No stridor. No respiratory distress, wheezes or  rales. Abdominal: She exhibits no distension. Musculoskeletal: She exhibits no edema. Neurological: She is alert and oriented to person, place, and time. Coordination normal.   Skin: Skin is warm. No rash noted. Not diaphoretic. No erythema. Psychiatric:  Normal mood and affect. Behavior is normal.   Current Outpatient Medications on File Prior to Visit   Medication Sig Dispense Refill    pravastatin (PRAVACHOL) 80 mg tablet Take 80 mg by mouth nightly.  oxybutynin chloride XL (DITROPAN XL) 5 mg CR tablet Take 5 mg by mouth daily.       multivitamin with minerals (HAIR,SKIN AND NAILS PO) Take  by mouth daily.  loratadine (CLARITIN) 10 mg tablet Take 10 mg by mouth daily.  multivitamin (ONE A DAY) tablet Take 1 Tab by mouth daily.  calcium citrate-vitamin D3 (CITRACAL + D) tablet Take  by mouth daily.  naproxen sodium (ALEVE) 220 mg Cap Take  by mouth as directed. No current facility-administered medications on file prior to visit. Lab Results   Component Value Date/Time    Cholesterol, total 167 09/19/2019 07:58 AM    HDL Cholesterol 65 09/19/2019 07:58 AM    LDL, calculated 84 09/19/2019 07:58 AM    VLDL, calculated 18 09/19/2019 07:58 AM    Triglyceride 90 09/19/2019 07:58 AM     Lab Results   Component Value Date/Time    ALT (SGPT) 18 09/19/2019 07:58 AM    AST (SGOT) 21 09/19/2019 07:58 AM    Alk. phosphatase 61 09/19/2019 07:58 AM    Bilirubin, direct 0.20 09/19/2019 07:58 AM    Bilirubin, total 0.8 09/19/2019 07:58 AM       ASSESSMENT and PLAN    1. Coronary artery disease: This is subclinical by calcium score of July 2012. Her electrocardiogram reveals normal sinus rhythm with low voltage in precordial leads otherwise no significant ST-T wave changes. Continue statin and aspirin for now. After 7 years proceed with stress echocardiogram to reevaluate her coronary artery disease. 2.  Hyperlipidemia: Discussed recent lipid panel. The LDL is not at goal.  Discussed options. Increase Pravachol to 80 mg daily. Lipids and liver function test in 2 months. 3.  Hypertension: This seems to be very well controlled at this time. No changes in her medications. See her back in a year if stress echo is within normal limits.

## 2019-10-01 NOTE — PROGRESS NOTES
Visit Vitals  /60 (BP 1 Location: Left arm, BP Patient Position: Sitting)   Pulse 64   Resp 16   Ht 5' 3\" (1.6 m)   Wt 148 lb (67.1 kg)   SpO2 98%   BMI 26.22 kg/m²

## 2019-10-30 ENCOUNTER — TELEPHONE (OUTPATIENT)
Dept: CARDIOLOGY CLINIC | Age: 74
End: 2019-10-30

## 2019-11-25 ENCOUNTER — HOSPITAL ENCOUNTER (OUTPATIENT)
Dept: LAB | Age: 74
Discharge: HOME | End: 2019-11-25
Payer: MEDICARE

## 2019-11-25 PROCEDURE — 36415 COLL VENOUS BLD VENIPUNCTURE: CPT

## 2019-11-25 PROCEDURE — 80061 LIPID PANEL: CPT

## 2019-11-25 PROCEDURE — 80076 HEPATIC FUNCTION PANEL: CPT

## 2019-11-26 LAB
ALBUMIN SERPL-MCNC: 4.1 G/DL (ref 3.5–4.8)
ALP SERPL-CCNC: 60 IU/L (ref 39–117)
ALT SERPL-CCNC: 18 IU/L (ref 0–32)
AST SERPL-CCNC: 19 IU/L (ref 0–40)
BILIRUB DIRECT SERPL-MCNC: 0.19 MG/DL (ref 0–0.4)
BILIRUB SERPL-MCNC: 0.8 MG/DL (ref 0–1.2)
CHOLEST SERPL-MCNC: 137 MG/DL (ref 100–199)
HDLC SERPL-MCNC: 58 MG/DL
INTERPRETATION, 910389: NORMAL
LDLC SERPL CALC-MCNC: 63 MG/DL (ref 0–99)
PROT SERPL-MCNC: 6.7 G/DL (ref 6–8.5)
TRIGL SERPL-MCNC: 80 MG/DL (ref 0–149)
VLDLC SERPL CALC-MCNC: 16 MG/DL (ref 5–40)

## 2019-12-02 ENCOUNTER — TELEPHONE (OUTPATIENT)
Dept: CARDIOLOGY CLINIC | Age: 74
End: 2019-12-02

## 2019-12-02 RX ORDER — PRAVASTATIN SODIUM 80 MG/1
80 TABLET ORAL
Qty: 30 TAB | Refills: 0 | Status: SHIPPED | OUTPATIENT
Start: 2019-12-02 | End: 2019-12-20 | Stop reason: SDUPTHER

## 2019-12-02 NOTE — TELEPHONE ENCOUNTER
Verified patient with two patient identifiers. Spoke with and gave lab result. She thinks taking 80 mg of Pravastatin is too much. No c/o myalgia except no energy

## 2019-12-05 NOTE — TELEPHONE ENCOUNTER
Verified patient with two patient identifiers. Spoke with patient and she said that she will keep taking Pravastatin until she hear from us.

## 2019-12-05 NOTE — TELEPHONE ENCOUNTER
Patient is requesting to speak with Sahil Loving regarding her prescription for pravastatin.      Phone: [phone]

## 2019-12-11 NOTE — TELEPHONE ENCOUNTER
Verified patient with two patient identifiers. Spoke with patient and was advised per Kole Amato to stop Pravachol temporarily for 4 weeks and call us back for update.

## 2019-12-17 ENCOUNTER — HOSPITAL ENCOUNTER (OUTPATIENT)
Dept: ULTRASOUND IMAGING | Age: 74
Discharge: HOME | End: 2019-12-17
Attending: OTOLARYNGOLOGY
Payer: MEDICARE

## 2019-12-17 DIAGNOSIS — E04.1 THYROID NODULE: ICD-10-CM

## 2019-12-17 PROCEDURE — 76536 US EXAM OF HEAD AND NECK: CPT

## 2019-12-19 ENCOUNTER — HOSPITAL ENCOUNTER (OUTPATIENT)
Dept: MAMMOGRAPHY | Age: 74
Discharge: HOME | End: 2019-12-19
Payer: MEDICARE

## 2019-12-19 DIAGNOSIS — Z12.31 VISIT FOR SCREENING MAMMOGRAM: ICD-10-CM

## 2019-12-19 PROCEDURE — 77067 SCR MAMMO BI INCL CAD: CPT

## 2019-12-20 ENCOUNTER — TELEPHONE (OUTPATIENT)
Dept: CARDIOLOGY CLINIC | Age: 74
End: 2019-12-20

## 2019-12-20 RX ORDER — PRAVASTATIN SODIUM 80 MG/1
80 TABLET ORAL
Qty: 90 TAB | Refills: 0 | Status: SHIPPED | OUTPATIENT
Start: 2019-12-20 | End: 2019-12-30

## 2019-12-20 NOTE — TELEPHONE ENCOUNTER
Spoke to pharmacist representative, Tammy Brito. Informed that pravachol had been increased to 80 mg nightly on 10-1-19. Verbalized understanding. Requested Prescriptions     Signed Prescriptions Disp Refills    pravastatin (PRAVACHOL) 80 mg tablet 90 Tab 0     Sig: Take 1 Tab by mouth nightly. Authorizing Provider: Bhavna Orellana     Ordering User: Josep Vitale     Verbal order per Dr. Jovany Serrato.     Future Appointments   Date Time Provider Emma Gonzalez   10/6/2020 11:00 AM Yo Virk  [address]

## 2019-12-20 NOTE — TELEPHONE ENCOUNTER
[facility] Pharmacist is in question about the 80 mg or 40 mg of Pravastatin. They have a prescription for the 80 mg and 40 mg. The order will be placed on hold until here back from the office.     Phone:[phone] Ref #[id number]

## 2019-12-26 ENCOUNTER — TELEPHONE (OUTPATIENT)
Dept: CARDIOLOGY CLINIC | Age: 74
End: 2019-12-26

## 2019-12-26 DIAGNOSIS — E78.89 LIPIDS ABNORMAL: Primary | ICD-10-CM

## 2019-12-26 DIAGNOSIS — I25.119 CORONARY ARTERY DISEASE WITH ANGINA PECTORIS, UNSPECIFIED VESSEL OR LESION TYPE, UNSPECIFIED WHETHER NATIVE OR TRANSPLANTED HEART (HCC): ICD-10-CM

## 2019-12-26 NOTE — TELEPHONE ENCOUNTER
Per Dr. David Sun regarding labs    [facility] lipid panel continue rx     Attempted to reach patient by telephone. A message was left for return call.

## 2019-12-26 NOTE — TELEPHONE ENCOUNTER
Identifiers x 2. Informed patient of recent lipid panel results and no change in regimen. Patient states that recent lipid results was when she was taking pravachol 80 mg daily. Has not been taking since 12-11-19 as instructed due to increased muscle pain with higher dosage. States that she had some aches when taking pravachol 40 mg daily.   Will route message to MD.

## 2019-12-30 RX ORDER — ATORVASTATIN CALCIUM 10 MG/1
10 TABLET, FILM COATED ORAL DAILY
Qty: 90 TAB | Refills: 1 | Status: SHIPPED | OUTPATIENT
Start: 2019-12-30 | End: 2020-05-05

## 2019-12-30 NOTE — TELEPHONE ENCOUNTER
Per Dr. Marleen Laureano:    Start lipitor 10 mg and labs in 2 months lipids and lft     Identifiers x 2. Informed of the above. Verbalized understanding. Requested Prescriptions     Signed Prescriptions Disp Refills    atorvastatin (LIPITOR) 10 mg tablet 90 Tab 1     Sig: Take 1 Tab by mouth daily. Authorizing Provider: Shady Cueva     Ordering User: Ni Vaca     Written order per Dr. Marleen Laureano. Medication profile updated.

## 2020-01-02 RX ORDER — PRAVASTATIN SODIUM 80 MG/1
TABLET ORAL
Qty: 30 TAB | Refills: 0 | OUTPATIENT
Start: 2020-01-02

## 2020-01-14 ENCOUNTER — TELEPHONE (OUTPATIENT)
Dept: CARDIOLOGY CLINIC | Age: 75
End: 2020-01-14

## 2020-01-14 DIAGNOSIS — R07.9 CHEST PAIN, UNSPECIFIED TYPE: Primary | ICD-10-CM

## 2020-01-14 NOTE — TELEPHONE ENCOUNTER
Identifiers x 2. Patient encouraged to call office or go to ER if chest pain reoccurs. Will inform MD of event. Patient verbalized understanding.

## 2020-01-14 NOTE — TELEPHONE ENCOUNTER
Patient states that she was at her brother in laws  yesterday and that on the way home from the [address] she started to experience some tightness in her arm and chest. Patient says that when she got home she felt fine and that she has felt fine since the incedent. Patients  advised her to just call and make Dr. Laurel Sapp aware. Please advise.     Phone: [phone]

## 2020-01-16 NOTE — TELEPHONE ENCOUNTER
Per Dr. Mary Barnes:      Likely ok but we should consider stress echo if he is agreeable       Identifiers x 2. Informed patient of the above. Instructions for test given:     Please wear comfortable clothing (shorts or pants with a shirt or blouse) and walking/athletic shoes. Do not eat or drink anything, except water, for at least 2 hours prior to your test.  Do ttake your scheduled medications prior to your test.    Our office will call you with results.       Future Appointments   Date Time Provider Emma Gonzalez   2/4/2020 10:00 AM ECHO, [address]   2/4/2020 10:00 AM STRESSECHO, [address]   10/6/2020 11:00 AM Brien Staton  [address]

## 2020-02-07 ENCOUNTER — TELEPHONE (OUTPATIENT)
Dept: CARDIOLOGY CLINIC | Age: 75
End: 2020-02-07

## 2020-02-11 NOTE — TELEPHONE ENCOUNTER
Per Dr. Meri Woodard:    Normal stress echo     Identifiers x 2. Informed of the above. Confirmed follow up.      Future Appointments   Date Time Provider Emma Gonzalez   10/6/2020 11:00 AM Eliz Abdalla  [address]

## 2020-03-05 ENCOUNTER — HOSPITAL ENCOUNTER (OUTPATIENT)
Dept: LAB | Age: 75
Discharge: HOME | End: 2020-03-05
Payer: MEDICARE

## 2020-03-05 PROCEDURE — 80061 LIPID PANEL: CPT

## 2020-03-05 PROCEDURE — 80076 HEPATIC FUNCTION PANEL: CPT

## 2020-03-05 PROCEDURE — 36415 COLL VENOUS BLD VENIPUNCTURE: CPT

## 2020-03-06 LAB
ALBUMIN SERPL-MCNC: 4.3 G/DL (ref 3.7–4.7)
ALP SERPL-CCNC: 55 IU/L (ref 39–117)
ALT SERPL-CCNC: 18 IU/L (ref 0–32)
AST SERPL-CCNC: 20 IU/L (ref 0–40)
BILIRUB DIRECT SERPL-MCNC: 0.19 MG/DL (ref 0–0.4)
BILIRUB SERPL-MCNC: 0.8 MG/DL (ref 0–1.2)
CHOLEST SERPL-MCNC: 135 MG/DL (ref 100–199)
HDLC SERPL-MCNC: 56 MG/DL
INTERPRETATION, 910389: NORMAL
LDLC SERPL CALC-MCNC: 65 MG/DL (ref 0–99)
PROT SERPL-MCNC: 6.6 G/DL (ref 6–8.5)
TRIGL SERPL-MCNC: 71 MG/DL (ref 0–149)
VLDLC SERPL CALC-MCNC: 14 MG/DL (ref 5–40)

## 2020-03-10 ENCOUNTER — TELEPHONE (OUTPATIENT)
Dept: CARDIOLOGY CLINIC | Age: 75
End: 2020-03-10

## 2020-03-16 NOTE — TELEPHONE ENCOUNTER
Per Dr. Pelon Mchugh:    Sami Castorena panel normal lft    Continue rx     Attempted to reach patient by telephone. A message was left for return call.

## 2020-03-17 NOTE — TELEPHONE ENCOUNTER
Identifiers x 2. Informed of lab results. Verbalized understanding. Confirmed follow up.       Future Appointments   Date Time Provider [facility] Lisa   10/6/2020 11:00 AM Wallis Epley,  [address]

## 2020-05-04 DIAGNOSIS — E78.89 LIPIDS ABNORMAL: ICD-10-CM

## 2020-05-05 RX ORDER — ATORVASTATIN CALCIUM 10 MG/1
10 TABLET, FILM COATED ORAL DAILY
Qty: 90 TAB | Refills: 1 | Status: SHIPPED | OUTPATIENT
Start: 2020-05-05 | End: 2020-10-29 | Stop reason: SDUPTHER

## 2020-05-05 NOTE — TELEPHONE ENCOUNTER
Requested Prescriptions     Signed Prescriptions Disp Refills    atorvastatin (LIPITOR) 10 mg tablet 90 Tab 1     Sig: Take 1 Tab by mouth daily. Authorizing Provider: Barbie Quiroz     Ordering User: Sierra Mills     Verbal order per Dr. Joey Diaz.     Future Appointments   Date Time Provider Emma Grangeri   10/6/2020 11:00 AM Abhinav Mcintosh  [address]

## 2020-06-26 ENCOUNTER — HOSPITAL ENCOUNTER (OUTPATIENT)
Dept: ULTRASOUND IMAGING | Age: 75
Discharge: HOME | End: 2020-06-26
Attending: OTOLARYNGOLOGY
Payer: MEDICARE

## 2020-06-26 DIAGNOSIS — E04.1 THYROID NODULE: ICD-10-CM

## 2020-06-26 PROCEDURE — 76536 US EXAM OF HEAD AND NECK: CPT

## 2020-09-16 ENCOUNTER — TELEPHONE (OUTPATIENT)
Dept: CARDIOLOGY CLINIC | Age: 75
End: 2020-09-16

## 2020-09-16 DIAGNOSIS — E78.89 LIPIDS ABNORMAL: ICD-10-CM

## 2020-09-16 DIAGNOSIS — I25.119 CORONARY ARTERY DISEASE WITH ANGINA PECTORIS, UNSPECIFIED VESSEL OR LESION TYPE, UNSPECIFIED WHETHER NATIVE OR TRANSPLANTED HEART (HCC): Primary | ICD-10-CM

## 2020-09-16 NOTE — TELEPHONE ENCOUNTER
Patient is calling as she has an annual with Dr. Jovany Serrato coming up in October and sue has blood work done prior. Patient would like to know if she will need to have blood work done to please fax and order to the [place] [address] at fax number [phone]. Please call patient and let her know.  Thanks     Phone: [phone]  [facility]: [phone]

## 2020-09-17 NOTE — TELEPHONE ENCOUNTER
Identifiers x 2. Informed patient that I will discuss with MD regarding any labs prior to follow up. Verbalized understanding. Per Dr. Marilee Asher:    Lipids and lfts thanks    Lab requisition mailed to patient.

## 2020-09-18 DIAGNOSIS — E78.89 LIPIDS ABNORMAL: ICD-10-CM

## 2020-09-18 DIAGNOSIS — I25.119 CORONARY ARTERY DISEASE WITH ANGINA PECTORIS, UNSPECIFIED VESSEL OR LESION TYPE, UNSPECIFIED WHETHER NATIVE OR TRANSPLANTED HEART (HCC): ICD-10-CM

## 2020-09-30 ENCOUNTER — HOSPITAL ENCOUNTER (OUTPATIENT)
Dept: LAB | Age: 75
Discharge: HOME | End: 2020-09-30
Payer: MEDICARE

## 2020-09-30 PROCEDURE — 36415 COLL VENOUS BLD VENIPUNCTURE: CPT

## 2020-09-30 PROCEDURE — 80076 HEPATIC FUNCTION PANEL: CPT

## 2020-09-30 PROCEDURE — 80061 LIPID PANEL: CPT

## 2020-10-01 LAB
ALBUMIN SERPL-MCNC: 4.4 G/DL (ref 3.7–4.7)
ALP SERPL-CCNC: 67 IU/L (ref 39–117)
ALT SERPL-CCNC: 19 IU/L (ref 0–32)
AST SERPL-CCNC: 22 IU/L (ref 0–40)
BILIRUB DIRECT SERPL-MCNC: 0.22 MG/DL (ref 0–0.4)
BILIRUB SERPL-MCNC: 0.9 MG/DL (ref 0–1.2)
CHOLEST SERPL-MCNC: 157 MG/DL (ref 100–199)
HDLC SERPL-MCNC: 65 MG/DL
INTERPRETATION, 910389: NORMAL
LDLC SERPL CALC-MCNC: 74 MG/DL (ref 0–99)
PROT SERPL-MCNC: 6.7 G/DL (ref 6–8.5)
TRIGL SERPL-MCNC: 99 MG/DL (ref 0–149)
VLDLC SERPL CALC-MCNC: 18 MG/DL (ref 5–40)

## 2020-10-06 ENCOUNTER — OFFICE VISIT (OUTPATIENT)
Dept: CARDIOLOGY CLINIC | Age: 75
End: 2020-10-06
Payer: MEDICARE

## 2020-10-06 VITALS
HEIGHT: 64 IN | BODY MASS INDEX: 25.27 KG/M2 | DIASTOLIC BLOOD PRESSURE: 60 MMHG | RESPIRATION RATE: 18 BRPM | SYSTOLIC BLOOD PRESSURE: 110 MMHG | OXYGEN SATURATION: 97 % | WEIGHT: 148 LBS | HEART RATE: 60 BPM

## 2020-10-06 DIAGNOSIS — I25.119 CORONARY ARTERY DISEASE WITH ANGINA PECTORIS, UNSPECIFIED VESSEL OR LESION TYPE, UNSPECIFIED WHETHER NATIVE OR TRANSPLANTED HEART (HCC): ICD-10-CM

## 2020-10-06 DIAGNOSIS — E78.89 LIPIDS ABNORMAL: ICD-10-CM

## 2020-10-06 DIAGNOSIS — I25.119 CORONARY ARTERY DISEASE WITH ANGINA PECTORIS, UNSPECIFIED VESSEL OR LESION TYPE, UNSPECIFIED WHETHER NATIVE OR TRANSPLANTED HEART (HCC): Primary | ICD-10-CM

## 2020-10-06 PROCEDURE — 1101F PT FALLS ASSESS-DOCD LE1/YR: CPT | Performed by: SPECIALIST

## 2020-10-06 PROCEDURE — G8432 DEP SCR NOT DOC, RNG: HCPCS | Performed by: SPECIALIST

## 2020-10-06 PROCEDURE — G8400 PT W/DXA NO RESULTS DOC: HCPCS | Performed by: SPECIALIST

## 2020-10-06 PROCEDURE — G9899 SCRN MAM PERF RSLTS DOC: HCPCS | Performed by: SPECIALIST

## 2020-10-06 PROCEDURE — 93010 ELECTROCARDIOGRAM REPORT: CPT | Performed by: SPECIALIST

## 2020-10-06 PROCEDURE — G8427 DOCREV CUR MEDS BY ELIG CLIN: HCPCS | Performed by: SPECIALIST

## 2020-10-06 PROCEDURE — 1090F PRES/ABSN URINE INCON ASSESS: CPT | Performed by: SPECIALIST

## 2020-10-06 PROCEDURE — G0463 HOSPITAL OUTPT CLINIC VISIT: HCPCS | Performed by: SPECIALIST

## 2020-10-06 PROCEDURE — 99214 OFFICE O/P EST MOD 30 MIN: CPT | Performed by: SPECIALIST

## 2020-10-06 PROCEDURE — G8419 CALC BMI OUT NRM PARAM NOF/U: HCPCS | Performed by: SPECIALIST

## 2020-10-06 PROCEDURE — G8536 NO DOC ELDER MAL SCRN: HCPCS | Performed by: SPECIALIST

## 2020-10-06 PROCEDURE — 3017F COLORECTAL CA SCREEN DOC REV: CPT | Performed by: SPECIALIST

## 2020-10-06 PROCEDURE — 93005 ELECTROCARDIOGRAM TRACING: CPT | Performed by: SPECIALIST

## 2020-10-06 NOTE — PROGRESS NOTES
Visit Vitals  /60 (BP 1 Location: Left arm, BP Patient Position: Sitting)   Pulse 60   Resp 18   Ht 5' 4\" (1.626 m)   Wt 148 lb (67.1 kg)   SpO2 97%   BMI 25.40 kg/m²

## 2020-10-06 NOTE — PROGRESS NOTES
[facility]                                                            OFFICE NOTE        Dano Miranda M.D.,KASSANDRA Deb Patricio   1945  [id number]    Date/Time:  10/6/246492:09 AM          SUBJECTIVE:  Doing very well no cp or sob or palpitations   walks 3 times a week with no issues       Assessment/Plan  1. Coronary artery disease: This is subclinical by calcium score of 2012. Her electrocardiogram reveals normal sinus rhythm with low voltage in precordial leads otherwise no significant ST-T wave changes. Continue statin and aspirin for now.     Stress echpo normal on     Discussed ca score repeat after 8 years     2. Hyperlipidemia: Discussed recent lipid panel. Continue lipitor  Panel is good   lft are normal     3. Hypertension: This seems to be very well controlled at this time. No changes in her medications.     See her back in a year or sooner if any issues.              HPI   69 y.o. female. She has no h/o HTN, DM she does have bordeline increase in cholesterol. Seen Here for elevated ca score  Ca score on :  Left main: 0  Left anterior descendin  Left circumflex: 0  Right coronary: 0  Posterior descendin    Total calcium score: 128    Stress echo on 10/12 no ischemia or mi and EF at rest 60%    PMH: urinary incontinence  PSH : as above  SH: no tobacco, quit in , occasional etoh, retired  FH: not significant for early CAD              CARDIAC STUDIES        20   ECHO STRESS 2020    Narrative · Baseline ECG: Normal sinus rhythm. · Moderate risk Duke treadmill score. · Negative stress test.  · Normal stress echocardiogram. Low risk study.         Signed by: Charlotte Hammer MD                                 EKG Results     None              IMAGING      MRI Results (most recent):  No results found for this or any previous visit.    Purvi Joshua Results (most recent):  Results from Hospital Encounter encounter on 12   CT HEART W/O CONT WITH CALCIUM    Narrative **Final Report**       ICD Codes / Adm. Diagnosis: V82.9   / Screening for unspecified cond    Examination:  CT HEART RUPERT TO SC  - [id number] - 2012  9:44AM  Accession No:  [id number]  Reason:  [id number]      REPORT:  Coronary Artery CT Quantitative Calcium Scoring. Technique: Unenhanced multislice helical limited chest CT. CALCIUM SCORE  0-0 = No evidence of CAD  1-10 = Minimal evidence of CAD   = Mild evidence of CAD  101-400 = Moderate evidence of CAD  >400 = Extensive evidence of CAD      Left main: 0  Left anterior descendin  Left circumflex: 0  Right coronary: 0  Posterior descendin    Total calcium score: 128    Your score of 128 places you in the 85th percentile rank. That means that   15% of the females at the ages from 64 to 72 will have a higher calcium   score than you. Chest CT findings: There is a 4 mm pleural based nodular density in the   right middle lobe. There is a small hiatal hernia. IMPRESSION: Total calcium score of 128. This indicates moderate evidence of   plaque making moderate nonobstructive coronary artery disease highly likely. The result should be discussed with your physician taking into account other   risk factors such as age, gender, family history, diabetes, smoking or high   cholesterol levels. 4 mm pleural based nodular density right middle lobe. RECOMMENDATIONS FOR FOLLOW-UP AND MANAGEMENT OF NODULES SMALLER THAN 8 MM   DETECTED INCIDENTALLY AT NONSCREENING CT:    LOW-RISK PATIENTS:    LESS THAN OR EQUAL TO 4 MM:  No follow-up needed. GREATER THAN 4-6 MM:  Follow-up CT at 12 mo; if unchanged, no further   follow-up. GREATER THAN 6-8 MM:  Initial follow-up CT at 6-12 months then at 18-24   months if no change.   GREATER THAN 8 MM:  Follow-up CT at around 3, 9, and 24 months, dynamic   contrast-enhanced CT, PET, and/or biopsy. HIGH-RISK PATIENTS:    LESS THAN OR EQUAL TO 4 MM:  Follow-up CT at 12 months; if unchanged, no   further follow-up. GREATER THAN 4-6 MM:  Initial follow-up CT at 6-12 months then at 18-24   months if no change. GREATER THAN 6-8 MM:  Initial follow-up CT at 3-6 months then at 9-12 and 24   months if no change. GREATER THAN 8 MM:  Same as for low-risk patient. Guidelines for Management of Small Pulmonary Nodules Detected on CT Scans: A   Statement from the Fleischner Society\"  [facility] 2005;   [phone]              Signing/Reading Doctor: Ree Parker ([id number])    Approved: Ree Parker ([id number])  2012                                      XR Results (most recent):  Results from [place] encounter on 08/10/18   XR CHEST PA LAT    Narrative INDICATION: chronic cough    EXAM: CXR 2 View    FINDINGS: Frontal and lateral views of the chest show clear lungs. Heart size is  normal. There is no pulmonary edema. There is no evident pneumothorax,  adenopathy or effusion. Impression IMPRESSION: Normal two view chest x-ray.              Past Medical History:   Diagnosis Date    High cholesterol     Ill-defined condition     Over active bladder    Menopause     Thyroid disease     cyst, biopsy okay     Past Surgical History:   Procedure Laterality Date    COLONOSCOPY N/A 10/30/2018    COLONOSCOPY performed by Roseanna Blake MD at [address] [place] [address]  10/30/2018         COLORECTAL SCRN; HI RISK IND  10/30/2018         HX COLONOSCOPY      HX OTHER SURGICAL      thyroid cyst, biopsy    GERMANIA BIOPSY BREAST STEREOTACTIC Left     benign bx     Social History     Tobacco Use    Smoking status: Former Smoker     Packs/day: 1.00     Years: 10.00     Pack years: 10.00     Types: Cigarettes     Last attempt to quit: 1999     Years since quittin.5    Smokeless tobacco: Never Used   Substance Use Topics    Alcohol use: Yes     Alcohol/week: 0.0 standard drinks     Comment: one glass wine a week    Drug use: No     Family History   Problem Relation Age of Onset    Breast Cancer Paternal Grandmother     Stroke Mother     Colon Cancer Father     Hypertension Sister     COPD Sister     High Cholesterol Sister      No Known Allergies      There were no vitals taken for this visit. There were no vitals filed for this visit. Review of Systems:   Pertinent items are noted in the History of Present Illness. Visit Vitals  /60 (BP 1 Location: Left arm, BP Patient Position: Sitting)   Pulse 60   Resp 18   Ht 5' 4\" (1.626 m)   Wt 148 lb (67.1 kg)   SpO2 97%   BMI 25.40 kg/m²     General Appearance:  Well developed, well nourished,alert and oriented x 3, and individual in no acute distress. Ears/Nose/Mouth/Throat:   Hearing grossly normal.         Neck: Supple. Chest:   Lungs clear to auscultation bilaterally. Cardiovascular:  Regular rate and rhythm, S1, S2 normal, no murmur. Abdomen:   Soft, non-tender, bowel sounds are active. Extremities: No edema bilaterally. Skin: Warm and dry. Current Outpatient Medications on File Prior to Visit   Medication Sig Dispense Refill    atorvastatin (LIPITOR) 10 mg tablet Take 1 Tab by mouth daily. 90 Tab 1    oxybutynin chloride XL (DITROPAN XL) 5 mg CR tablet Take 5 mg by mouth daily.  multivitamin with minerals (HAIR,SKIN AND NAILS PO) Take  by mouth daily.  loratadine (CLARITIN) 10 mg tablet Take 10 mg by mouth daily.  multivitamin (ONE A DAY) tablet Take 1 Tab by mouth daily.  calcium citrate-vitamin D3 (CITRACAL + D) tablet Take  by mouth daily.  naproxen sodium (ALEVE) 220 mg Cap Take  by mouth as directed. No current facility-administered medications on file prior to visit. Devonte Urban had no medications administered during this visit.      Current Outpatient Medications   Medication Sig    atorvastatin (LIPITOR) 10 mg tablet Take 1 Tab by mouth daily.  oxybutynin chloride XL (DITROPAN XL) 5 mg CR tablet Take 5 mg by mouth daily.  multivitamin with minerals (HAIR,SKIN AND NAILS PO) Take  by mouth daily.  loratadine (CLARITIN) 10 mg tablet Take 10 mg by mouth daily.  multivitamin (ONE A DAY) tablet Take 1 Tab by mouth daily.  calcium citrate-vitamin D3 (CITRACAL + D) tablet Take  by mouth daily.  naproxen sodium (ALEVE) 220 mg Cap Take  by mouth as directed. No current facility-administered medications for this visit. Lab Results   Component Value Date/Time    Cholesterol, total 157 09/30/2020 08:32 AM    HDL Cholesterol 65 09/30/2020 08:32 AM    LDL, calculated 65 03/05/2020 08:07 AM    LDL Chol Calc (NIH) 74 09/30/2020 08:32 AM    VLDL, calculated 14 03/05/2020 08:07 AM    VLDL Cholesterol Shashi 18 09/30/2020 08:32 AM    Triglyceride 99 09/30/2020 08:32 AM       No results found for: NA, K, CL, CO2, AGAP, GLU, BUN, CREA, BUCR, GFRAA, GFRNA, CA, GFRAA    Lab Results   Component Value Date/Time    ALT (SGPT) 19 09/30/2020 08:32 AM    Alk.  phosphatase 67 09/30/2020 08:32 AM    Bilirubin, direct 0.22 09/30/2020 08:32 AM    Bilirubin, total 0.9 09/30/2020 08:32 AM       No results found for: WBC, WBCLT, HGBPOC, HGB, HGBP, HCTPOC, HCT, PHCT, RBCH, PLT, MCV, HGBEXT, HCTEXT, PLTEXT    No results found for: TSH, TSH2, TSH3, TSHP, TSHEXT      Lab Results   Component Value Date/Time    Cholesterol, total 157 09/30/2020 08:32 AM    Cholesterol, total 135 03/05/2020 08:07 AM    Cholesterol, total 137 11/25/2019 08:46 AM    Cholesterol, total 167 09/19/2019 07:58 AM    Cholesterol, total 150 01/10/2019 08:06 AM    HDL Cholesterol 65 09/30/2020 08:32 AM    HDL Cholesterol 56 03/05/2020 08:07 AM    HDL Cholesterol 58 11/25/2019 08:46 AM    HDL Cholesterol 65 09/19/2019 07:58 AM    HDL Cholesterol 60 01/10/2019 08:06 AM    LDL, calculated 65 03/05/2020 08:07 AM    LDL, calculated 63 11/25/2019 08:46 AM    LDL, calculated 84 09/19/2019 07:58 AM    LDL, calculated 70 01/10/2019 08:06 AM    LDL, calculated 79 09/21/2018 08:09 AM    LDL Chol Calc (NIH) 74 09/30/2020 08:32 AM    Triglyceride 99 09/30/2020 08:32 AM    Triglyceride 71 03/05/2020 08:07 AM    Triglyceride 80 11/25/2019 08:46 AM    Triglyceride 90 09/19/2019 07:58 AM    Triglyceride 101 01/10/2019 08:06 AM                Please note that this dictation was completed with [employer], the computer voice recognition software. Quite often unanticipated grammatical, syntax, homophones, and other interpretative errors are inadvertently transcribed by the computer software. Please disregard these errors. Please excuse any errors that have escaped final proofreading.

## 2020-10-15 ENCOUNTER — HOSPITAL ENCOUNTER (OUTPATIENT)
Dept: BONE DENSITY | Age: 75
Discharge: HOME | End: 2020-10-15
Attending: OBSTETRICS & GYNECOLOGY
Payer: MEDICARE

## 2020-10-15 DIAGNOSIS — Z13.820 SPECIAL SCREENING FOR OSTEOPOROSIS: ICD-10-CM

## 2020-10-15 DIAGNOSIS — N95.8 POSTARTIFICIAL MENOPAUSAL SYNDROME: ICD-10-CM

## 2020-10-15 PROCEDURE — 77080 DXA BONE DENSITY AXIAL: CPT

## 2020-10-29 DIAGNOSIS — E78.89 LIPIDS ABNORMAL: ICD-10-CM

## 2020-10-29 RX ORDER — ATORVASTATIN CALCIUM 10 MG/1
10 TABLET, FILM COATED ORAL DAILY
Qty: 90 TAB | Refills: 3 | Status: SHIPPED | OUTPATIENT
Start: 2020-10-29 | End: 2021-11-29

## 2020-10-29 NOTE — TELEPHONE ENCOUNTER
Requested Prescriptions     Signed Prescriptions Disp Refills    atorvastatin (LIPITOR) 10 mg tablet 90 Tab 3     Sig: Take 1 Tab by mouth daily. Authorizing Provider: Aebl Navarrete     Ordering User: Michelle Saldivar     Verbal order per Dr. Meaghan Bynum.     Future Appointments   Date Time Provider Emma Gonzalez   10/7/2021 10:00 AM MD TRACY Casanova AMB

## 2020-11-25 ENCOUNTER — TRANSCRIBE ORDER (OUTPATIENT)
Dept: SCHEDULING | Age: 75
End: 2020-11-25

## 2020-11-25 DIAGNOSIS — Z12.31 ENCOUNTER FOR SCREENING MAMMOGRAM FOR MALIGNANT NEOPLASM OF BREAST: Primary | ICD-10-CM

## 2021-01-06 ENCOUNTER — HOSPITAL ENCOUNTER (OUTPATIENT)
Dept: MAMMOGRAPHY | Age: 76
Discharge: HOME | End: 2021-01-06
Payer: MEDICARE

## 2021-01-06 DIAGNOSIS — Z12.31 ENCOUNTER FOR SCREENING MAMMOGRAM FOR MALIGNANT NEOPLASM OF BREAST: ICD-10-CM

## 2021-01-06 PROCEDURE — 77067 SCR MAMMO BI INCL CAD: CPT | Performed by: FAMILY MEDICINE

## 2021-09-09 ENCOUNTER — TELEPHONE (OUTPATIENT)
Dept: CARDIOLOGY CLINIC | Age: 76
End: 2021-09-09

## 2021-09-09 NOTE — TELEPHONE ENCOUNTER
Attempted to reach patient to reschedule upcoming appointment with Dr. Mariela Verduzco on 10/7. Unable to leave a voicemail mailing letter to request patient call to reschedule. Please schedule to next available.

## 2021-11-29 DIAGNOSIS — E78.89 LIPIDS ABNORMAL: ICD-10-CM

## 2021-11-29 RX ORDER — ATORVASTATIN CALCIUM 10 MG/1
TABLET, FILM COATED ORAL
Qty: 90 TABLET | Refills: 0 | Status: SHIPPED | OUTPATIENT
Start: 2021-11-29 | End: 2022-02-28

## 2021-11-29 NOTE — TELEPHONE ENCOUNTER
Cardiologist: Dr. Phillip Miranda    Last appt: 10/6/2020  Future Appointments   Date Time Provider Emma Grangeri   12/22/2021 10:20 AM MD TRACY Tafoya BS AMB       Requested Prescriptions     Signed Prescriptions Disp Refills    atorvastatin (LIPITOR) 10 mg tablet 90 Tablet 0     Sig: TAKE 1 TABLET DAILY     Authorizing Provider: Ha Patricia     Ordering User: TAYLER YANCEY         Refills VO per Dr. Bhupendra Guillen.

## 2021-12-15 LAB
ALBUMIN SERPL-MCNC: 4.2 G/DL (ref 3.7–4.7)
ALP SERPL-CCNC: 59 IU/L (ref 44–121)
ALT SERPL-CCNC: 19 IU/L (ref 0–32)
AST SERPL-CCNC: 23 IU/L (ref 0–40)
BILIRUB DIRECT SERPL-MCNC: 0.23 MG/DL (ref 0–0.4)
BILIRUB SERPL-MCNC: 0.9 MG/DL (ref 0–1.2)
CHOLEST SERPL-MCNC: 159 MG/DL (ref 100–199)
HDLC SERPL-MCNC: 62 MG/DL
IMP & REVIEW OF LAB RESULTS: NORMAL
LDLC SERPL CALC-MCNC: 79 MG/DL (ref 0–99)
PROT SERPL-MCNC: 6.7 G/DL (ref 6–8.5)
TRIGL SERPL-MCNC: 99 MG/DL (ref 0–149)
VLDLC SERPL CALC-MCNC: 18 MG/DL (ref 5–40)

## 2022-01-03 ENCOUNTER — TRANSCRIBE ORDER (OUTPATIENT)
Dept: SCHEDULING | Age: 77
End: 2022-01-03

## 2022-01-03 DIAGNOSIS — Z12.31 VISIT FOR SCREENING MAMMOGRAM: Primary | ICD-10-CM

## 2022-01-20 ENCOUNTER — HOSPITAL ENCOUNTER (OUTPATIENT)
Dept: MAMMOGRAPHY | Age: 77
Discharge: HOME | End: 2022-01-20
Attending: OBSTETRICS & GYNECOLOGY
Payer: MEDICARE

## 2022-01-20 ENCOUNTER — TRANSCRIBE ORDER (OUTPATIENT)
Dept: GENERAL RADIOLOGY | Age: 77
End: 2022-01-20

## 2022-01-20 DIAGNOSIS — Z12.31 SCREENING MAMMOGRAM, ENCOUNTER FOR: Primary | ICD-10-CM

## 2022-01-20 DIAGNOSIS — Z12.31 SCREENING MAMMOGRAM, ENCOUNTER FOR: ICD-10-CM

## 2022-01-20 PROCEDURE — 77063 BREAST TOMOSYNTHESIS BI: CPT

## 2022-01-27 ENCOUNTER — OFFICE VISIT (OUTPATIENT)
Dept: CARDIOLOGY CLINIC | Age: 77
End: 2022-01-27
Payer: MEDICARE

## 2022-01-27 VITALS
BODY MASS INDEX: 25.61 KG/M2 | HEART RATE: 69 BPM | DIASTOLIC BLOOD PRESSURE: 86 MMHG | WEIGHT: 150 LBS | HEIGHT: 64 IN | SYSTOLIC BLOOD PRESSURE: 136 MMHG | OXYGEN SATURATION: 97 % | RESPIRATION RATE: 14 BRPM

## 2022-01-27 DIAGNOSIS — I25.119 CORONARY ARTERY DISEASE WITH ANGINA PECTORIS, UNSPECIFIED VESSEL OR LESION TYPE, UNSPECIFIED WHETHER NATIVE OR TRANSPLANTED HEART (HCC): Primary | ICD-10-CM

## 2022-01-27 PROCEDURE — 93005 ELECTROCARDIOGRAM TRACING: CPT | Performed by: SPECIALIST

## 2022-01-27 PROCEDURE — 93010 ELECTROCARDIOGRAM REPORT: CPT | Performed by: SPECIALIST

## 2022-01-27 PROCEDURE — G8510 SCR DEP NEG, NO PLAN REQD: HCPCS | Performed by: SPECIALIST

## 2022-01-27 PROCEDURE — G8536 NO DOC ELDER MAL SCRN: HCPCS | Performed by: SPECIALIST

## 2022-01-27 PROCEDURE — G9899 SCRN MAM PERF RSLTS DOC: HCPCS | Performed by: SPECIALIST

## 2022-01-27 PROCEDURE — G0463 HOSPITAL OUTPT CLINIC VISIT: HCPCS | Performed by: SPECIALIST

## 2022-01-27 PROCEDURE — 3017F COLORECTAL CA SCREEN DOC REV: CPT | Performed by: SPECIALIST

## 2022-01-27 PROCEDURE — G8419 CALC BMI OUT NRM PARAM NOF/U: HCPCS | Performed by: SPECIALIST

## 2022-01-27 PROCEDURE — G8427 DOCREV CUR MEDS BY ELIG CLIN: HCPCS | Performed by: SPECIALIST

## 2022-01-27 PROCEDURE — G8399 PT W/DXA RESULTS DOCUMENT: HCPCS | Performed by: SPECIALIST

## 2022-01-27 PROCEDURE — 99214 OFFICE O/P EST MOD 30 MIN: CPT | Performed by: SPECIALIST

## 2022-01-27 PROCEDURE — 1101F PT FALLS ASSESS-DOCD LE1/YR: CPT | Performed by: SPECIALIST

## 2022-01-27 PROCEDURE — 1090F PRES/ABSN URINE INCON ASSESS: CPT | Performed by: SPECIALIST

## 2022-01-27 NOTE — PROGRESS NOTES
Visit Vitals  /86 (BP 1 Location: Left arm, BP Patient Position: Sitting, BP Cuff Size: Adult)   Pulse 69   Resp 14   Ht 5' 4\" (1.626 m)   Wt 150 lb (68 kg)   SpO2 97%   BMI 25.75 kg/m²

## 2022-01-27 NOTE — PROGRESS NOTES
[address] [facility]                                                            OFFICE NOTE        Fiorella Cruz M.D.,KASSANDRA Darryn Charles   1945  [id number]    Date/Time:  :10 AM            SUBJECTIVE:  She is doing very well. She has had no chest pain or shortness of breath or palpitations. She remains active. Assessment/Plan  1.  Coronary artery disease: This is subclinical by calcium score of 2012.  Her electrocardiogram reveals normal sinus rhythm with low voltage in precordial leads otherwise no significant ST-T wave changes.  Continue statin and aspirin for now.     Stress echpo normal on      Discussed ca score she will proceed with a new calcium score this February. She will let me know the results of the least for me the results. 2.  Hyperlipidemia: Discussed recent lipid panel. Continue lipitor  Panel is good   lft are normal     3.  Hypertension: This seems to be very well controlled at this time.  No changes in her medications.     See her back in a year or sooner if any issues.           HPI   78 y. o. female. She has no h/o HTN, DM she does have bordeline increase in cholesterol. Seen Here for elevated ca score  Ca score on :  Left main: 0  Left anterior descendin  Left circumflex: 0  Right coronary: 0  Posterior descendin    Total calcium score: 128     Stress echo on 10/12 no ischemia or mi and EF at rest 60%     PMH: urinary incontinence  PSH : as above  SH: no tobacco, quit in , occasional etoh, retired  FH: not significant for early CAD              CARDIAC STUDIES        20    ECHO STRESS 2020    Interpretation Summary  · Baseline ECG: Normal sinus rhythm. · Moderate risk Duke treadmill score. · Negative stress test.  · Normal stress echocardiogram. Low risk study.     Signed by: George Camara MD on 2020 1:54 PM                              EKG Results     None              IMAGING      MRI Results (most recent):  No results found for this or any previous visit. CT Results (most recent):  Results from Hospital Encounter encounter on 12    CT HEART W/O CONT WITH CALCIUM    Narrative  **Final Report**      ICD Codes / Adm. Diagnosis: V82.9   / Screening for unspecified cond  Examination:  CT HEART W [place] TO SC  - [id number] - 2012  9:44AM  Accession No:  [id number]  Reason:  [id number]      REPORT:  Coronary Artery CT Quantitative Calcium Scoring. Technique: Unenhanced multislice helical limited chest CT. CALCIUM SCORE  0-0 = No evidence of CAD  1-10 = Minimal evidence of CAD   = Mild evidence of CAD  101-400 = Moderate evidence of CAD  >400 = Extensive evidence of CAD      Left main: 0  Left anterior descendin  Left circumflex: 0  Right coronary: 0  Posterior descendin    Total calcium score: 128    Your score of 128 places you in the 85th percentile rank. That means that  15% of the females at the ages from 64 to 72 will have a higher calcium  score than you. Chest CT findings: There is a 4 mm pleural based nodular density in the  right middle lobe. There is a small hiatal hernia. IMPRESSION: Total calcium score of 128. This indicates moderate evidence of  plaque making moderate nonobstructive coronary artery disease highly likely. The result should be discussed with your physician taking into account other  risk factors such as age, gender, family history, diabetes, smoking or high  cholesterol levels. 4 mm pleural based nodular density right middle lobe. RECOMMENDATIONS FOR FOLLOW-UP AND MANAGEMENT OF NODULES SMALLER THAN 8 MM  DETECTED INCIDENTALLY AT NONSCREENING CT:    LOW-RISK PATIENTS:    LESS THAN OR EQUAL TO 4 MM:  No follow-up needed. GREATER THAN 4-6 MM:  Follow-up CT at 12 mo; if unchanged, no further  follow-up.   GREATER THAN 6-8 MM:  Initial follow-up CT at 6-12 months then at 18-24  months if no change. GREATER THAN 8 MM:  Follow-up CT at around 3, 9, and 24 months, dynamic  contrast-enhanced CT, PET, and/or biopsy. HIGH-RISK PATIENTS:    LESS THAN OR EQUAL TO 4 MM:  Follow-up CT at 12 months; if unchanged, no  further follow-up. GREATER THAN 4-6 MM:  Initial follow-up CT at 6-12 months then at 18-24  months if no change. GREATER THAN 6-8 MM:  Initial follow-up CT at 3-6 months then at 9-12 and 24  months if no change. GREATER THAN 8 MM:  Same as for low-risk patient. Guidelines for Management of Small Pulmonary Nodules Detected on CT Scans: A  Statement from the Fleischner Society\"  [facility] 2005;  [phone]          Signing/Reading Doctor: Kiya Henry ([id number])  Approved: Kiya Henry ([id number])  07/24/2012      XR Results (most recent):  Results from [place] encounter on 08/10/18    XR CHEST PA LAT    Narrative  INDICATION: chronic cough    EXAM: CXR 2 View    FINDINGS: Frontal and lateral views of the chest show clear lungs. Heart size is  normal. There is no pulmonary edema. There is no evident pneumothorax,  adenopathy or effusion. Impression  IMPRESSION: Normal two view chest x-ray.           Past Medical History:   Diagnosis Date    High cholesterol     Ill-defined condition     Over active bladder    Menopause 2007    Thyroid disease     cyst, biopsy okay     Past Surgical History:   Procedure Laterality Date    COLONOSCOPY N/A 10/30/2018    COLONOSCOPY performed by Nicolás Sotomayor MD at [address] [place] [address]  10/30/2018         COLORECTAL SCRN; HI RISK IND  10/30/2018         HX COLONOSCOPY      HX OTHER SURGICAL      thyroid cyst, biopsy    GERMANIA BIOPSY BREAST STEREOTACTIC Left     benign bx     Social History     Tobacco Use    Smoking status: Former Smoker     Packs/day: 1.00     Years: 10.00     Pack years: 10.00     Types: Cigarettes     Quit date: 4/12/1999 Years since quittin.8    Smokeless tobacco: Never Used   Substance Use Topics    Alcohol use: Yes     Alcohol/week: 0.0 standard drinks     Comment: one glass wine a week    Drug use: No     Family History   Problem Relation Age of Onset    Breast Cancer Paternal Grandmother     Stroke Mother     Colon Cancer Father     Hypertension Sister     COPD Sister     High Cholesterol Sister      No Known Allergies      There were no vitals taken for this visit. There were no vitals filed for this visit. Review of Systems:   Pertinent items are noted in the History of Present Illness. Visit Vitals  /86 (BP 1 Location: Left arm, BP Patient Position: Sitting, BP Cuff Size: Adult)   Pulse 69   Resp 14   Ht 5' 4\" (1.626 m)   Wt 150 lb (68 kg)   SpO2 97%   BMI 25.75 kg/m²     General Appearance:  Well developed, well nourished,alert and oriented x 3, and individual in no acute distress. Ears/Nose/Mouth/Throat:   Hearing grossly normal.         Neck: Supple. Chest:   Lungs clear to auscultation bilaterally. Cardiovascular:  Regular rate and rhythm, S1, S2 normal, no murmur. Abdomen:   Soft, non-tender, bowel sounds are active. Extremities: No edema bilaterally. Skin: Warm and dry. Current Outpatient Medications on File Prior to Visit   Medication Sig Dispense Refill    atorvastatin (LIPITOR) 10 mg tablet TAKE 1 TABLET DAILY 90 Tablet 0    oxybutynin chloride XL (DITROPAN XL) 5 mg CR tablet Take 5 mg by mouth daily.  multivitamin with minerals (HAIR,SKIN AND NAILS PO) Take  by mouth daily.  loratadine (CLARITIN) 10 mg tablet Take 10 mg by mouth daily.  multivitamin (ONE A DAY) tablet Take 1 Tab by mouth daily.  calcium citrate-vitamin D3 (CITRACAL + D) tablet Take  by mouth daily.  naproxen sodium (ALEVE) 220 mg Cap Take  by mouth as directed. No current facility-administered medications on file prior to visit.        Brayden [facility] had no medications administered during this visit. Current Outpatient Medications   Medication Sig    atorvastatin (LIPITOR) 10 mg tablet TAKE 1 TABLET DAILY    oxybutynin chloride XL (DITROPAN XL) 5 mg CR tablet Take 5 mg by mouth daily.  multivitamin with minerals (HAIR,SKIN AND NAILS PO) Take  by mouth daily.  loratadine (CLARITIN) 10 mg tablet Take 10 mg by mouth daily.  multivitamin (ONE A DAY) tablet Take 1 Tab by mouth daily.  calcium citrate-vitamin D3 (CITRACAL + D) tablet Take  by mouth daily.  naproxen sodium (ALEVE) 220 mg Cap Take  by mouth as directed. No current facility-administered medications for this visit. Lab Results   Component Value Date/Time    Cholesterol, total 159 12/14/2021 08:19 AM    HDL Cholesterol 62 12/14/2021 08:19 AM    LDL, calculated 79 12/14/2021 08:19 AM    LDL, calculated 65 03/05/2020 08:07 AM    VLDL, calculated 18 12/14/2021 08:19 AM    VLDL, calculated 14 03/05/2020 08:07 AM    Triglyceride 99 12/14/2021 08:19 AM       No results found for: NA, K, CL, CO2, AGAP, GLU, BUN, CREA, BUCR, GFRAA, GFRNA, CA, GFRAA    Lab Results   Component Value Date/Time    ALT (SGPT) 19 12/14/2021 08:19 AM    Alk.  phosphatase 59 12/14/2021 08:19 AM    Bilirubin, direct 0.23 12/14/2021 08:19 AM    Bilirubin, total 0.9 12/14/2021 08:19 AM       No results found for: WBC, WBCLT, HGBPOC, HGB, HGBP, HCTPOC, HCT, PHCT, RBCH, PLT, MCV, HGBEXT, HCTEXT, PLTEXT    No results found for: TSH, TSH2, TSH3, TSHP, TSHEXT      Lab Results   Component Value Date/Time    Cholesterol, total 159 12/14/2021 08:19 AM    Cholesterol, total 157 09/30/2020 08:32 AM    Cholesterol, total 135 03/05/2020 08:07 AM    Cholesterol, total 137 11/25/2019 08:46 AM    Cholesterol, total 167 09/19/2019 07:58 AM    HDL Cholesterol 62 12/14/2021 08:19 AM    HDL Cholesterol 65 09/30/2020 08:32 AM    HDL Cholesterol 56 03/05/2020 08:07 AM    HDL Cholesterol 58 11/25/2019 08:46 AM HDL Cholesterol 65 09/19/2019 07:58 AM    LDL, calculated 79 12/14/2021 08:19 AM    LDL, calculated 74 09/30/2020 08:32 AM    LDL, calculated 65 03/05/2020 08:07 AM    LDL, calculated 63 11/25/2019 08:46 AM    LDL, calculated 84 09/19/2019 07:58 AM    LDL, calculated 70 01/10/2019 08:06 AM    LDL, calculated 79 09/21/2018 08:09 AM    Triglyceride 99 12/14/2021 08:19 AM    Triglyceride 99 09/30/2020 08:32 AM    Triglyceride 71 03/05/2020 08:07 AM    Triglyceride 80 11/25/2019 08:46 AM    Triglyceride 90 09/19/2019 07:58 AM                Please note that this dictation was completed with [employer], the [employer] voice recognition software. Quite often unanticipated grammatical, syntax, homophones, and other interpretative errors are inadvertently transcribed by the computer software. Please disregard these errors. Please excuse any errors that have escaped final proofreading.

## 2022-02-03 ENCOUNTER — TRANSCRIBE ORDER (OUTPATIENT)
Dept: SCHEDULING | Age: 77
End: 2022-02-03

## 2022-02-03 DIAGNOSIS — Z00.00 ROUTINE GENERAL MEDICAL EXAMINATION AT A HEALTH CARE FACILITY: Primary | ICD-10-CM

## 2022-02-11 ENCOUNTER — HOSPITAL ENCOUNTER (OUTPATIENT)
Dept: CT IMAGING | Age: 77
Discharge: HOME | End: 2022-02-11
Attending: SPECIALIST
Payer: MEDICARE

## 2022-02-11 DIAGNOSIS — Z00.00 ROUTINE GENERAL MEDICAL EXAMINATION AT A HEALTH CARE FACILITY: ICD-10-CM

## 2022-02-11 PROCEDURE — 75571 CT HRT W/O DYE W/CA TEST: CPT

## 2022-02-15 ENCOUNTER — TELEPHONE (OUTPATIENT)
Dept: CARDIOLOGY CLINIC | Age: 77
End: 2022-02-15

## 2022-02-15 NOTE — TELEPHONE ENCOUNTER
Patient is calling to let the doctor know that she had the heart calcium scan test and the results are in [facility].     [phone]

## 2022-02-18 NOTE — TELEPHONE ENCOUNTER
Jamin Brito MD  You 2 hours ago (1:42 PM)     MG    Noted 234 continue klipitor   Follo up as planned    Message text      TC to pt, ID verified. Advised as above per Dr. Buzz Torres. No further questions.

## 2022-02-27 DIAGNOSIS — E78.89 LIPIDS ABNORMAL: ICD-10-CM

## 2022-02-28 RX ORDER — ATORVASTATIN CALCIUM 10 MG/1
TABLET, FILM COATED ORAL
Qty: 90 TABLET | Refills: 0 | Status: SHIPPED | OUTPATIENT
Start: 2022-02-28 | End: 2022-05-23

## 2022-02-28 NOTE — TELEPHONE ENCOUNTER
Cardiologist: Dr. Marie Stout    Last appt: 1/27/2022  Future Appointments   Date Time Provider Emma Gonzalez   1/26/2023 10:00 AM MD TRACY Andrews BS AMB       Requested Prescriptions     Signed Prescriptions Disp Refills    atorvastatin (LIPITOR) 10 mg tablet 90 Tablet 0     Sig: TAKE 1 TABLET DAILY     Authorizing Provider: Debbie Rodrigues     Ordering User: TAYLER YANCEY         Refills VO per Dr. Marie Stout.

## 2022-05-21 DIAGNOSIS — E78.89 LIPIDS ABNORMAL: ICD-10-CM

## 2022-05-23 RX ORDER — ATORVASTATIN CALCIUM 10 MG/1
TABLET, FILM COATED ORAL
Qty: 90 TABLET | Refills: 2 | Status: SHIPPED | OUTPATIENT
Start: 2022-05-23

## 2022-05-23 NOTE — TELEPHONE ENCOUNTER
Cardiologist: Dr. Chayo Chandler    Last appt: 1/27/2022  Future Appointments   Date Time Provider Emma Grangeri   1/26/2023 10:00 AM MD TRACY Michaud BS AMB       Requested Prescriptions     Signed Prescriptions Disp Refills    atorvastatin (LIPITOR) 10 mg tablet 90 Tablet 2     Sig: TAKE 1 TABLET DAILY     Authorizing Provider: Alex Curtis     Ordering User: TAYLER YANCEY         Refills VO per Dr. Chayo Chandler.

## 2022-09-26 NOTE — PERIOP NOTES
[facility]  Ambulatory Surgery Unit  Pre-operative Instructions    Surgery/Procedure Date  10/4/2022            Tentative Arrival Time TBD      1. On the day of your surgery/procedure, please report to the Ambulatory Surgery Unit Registration Desk and sign in at your designated time. The Ambulatory Surgery Unit is located in [facility] on the [facility] side of the [facility] across from the [address] [place] [address]. Please have all of your health insurance cards, co-payment, and a photo ID.    **TWO adults may accompany you the day of the procedure. We have limited seating available. If our waiting room is at capacity, your ride may be asked to remain in their vehicle. No one under 15 is allowed in the waiting room. Masks, fully covering the mouth and nose, are required in the waiting room. 2. You must have someone with you to drive you home, as you should not drive a car for 24 hours following anesthesia. Please make arrangements for a responsible adult friend or family member to stay with you for at least the first 24 hours after your surgery. 3. Do not have anything to eat or drink (including water, gum, mints, coffee, juice) after 11:59 PM  10/3/2022. This may not apply to medications prescribed by your physician. (Please note below the special instructions with medications to take the morning of surgery, if applicable.)    4. We recommend you do not drink any alcoholic beverages for 24 hours before and after your surgery. 5. Contact your surgeons office for instructions on the following medications: non-steroidal anti-inflammatory drugs (i.e. Advil, Aleve), vitamins, and supplements. (Some surgeons will want you to stop these medications prior to surgery and others may allow you to take them)   **If you are currently taking Plavix, Coumadin, Aspirin and/or other blood-thinning agents, contact your surgeon for instructions. ** Your surgeon will partner with the physician prescribing these medications to determine if it is safe to stop or if you need to continue taking. Please do not stop taking these medications without instructions from your surgeon. 6. In an effort to help prevent surgical site infection, we ask that you shower with an anti-bacterial soap (i.e. Dial/Safeguard, or the soap provided to you at your preadmission testing appointment) for 3 days prior to and on the morning of surgery, using a fresh towel after each shower. (Please begin this process with fresh bed linens.) Do not apply any lotions, powders, or deodorants after the shower on the day of your procedure. If applicable, please do not shave the operative site for 48 hours prior to surgery. 7. Wear comfortable clothes. Wear glasses instead of contacts. Do not bring any jewelry or money (other than copays or fees as instructed). Do not wear make-up, particularly mascara, the morning of your surgery. Do not wear nail polish, particularly if you are having foot /hand surgery. Wear your hair loose or down, no ponytails, buns, olivia pins or clips. All body piercings must be removed. 8. You should understand that if you do not follow these instructions your surgery may be cancelled. If your physical condition changes (i.e. fever, cold or flu) please contact your surgeon as soon as possible. 9. It is important that you be on time. If a situation occurs where you may be late, or if you have any questions or problems, please call [phone].    10. Your surgery time may be subject to change. You will receive a phone call the day prior to surgery to confirm your arrival time. 11. Pediatric patients: please bring a change of clothes, diapers, bottle/sippy cup, pacifier, etc.      Special Instructions: Take all medications and inhalers, as prescribed, on the morning of surgery with a sip of water .   Insulin Dependent Diabetic patients: Take your diabetic medications as prescribed the day before surgery. Hold all diabetic medications the day of surgery. If you are scheduled to arrive for surgery after 8:00 AM, and your AM blood sugar is >200, please call Ambulatory Surgery. I understand a pre-operative phone call will be made to verify my surgery time. In the event that I am not available, I give permission for a message to be left on my answering service and/or with another person?       Yes        Reviewed instructions  with patient, able to verbalized understanding.         ___________________      ___________________      ________________  (Signature of Patient)          (Witness)                   (Date and Time)

## 2022-10-03 ENCOUNTER — ANESTHESIA EVENT (OUTPATIENT)
Dept: SURGERY | Age: 77
End: 2022-10-03
Payer: MEDICARE

## 2022-10-04 ENCOUNTER — ANESTHESIA (OUTPATIENT)
Dept: SURGERY | Age: 77
End: 2022-10-04
Payer: MEDICARE

## 2022-10-04 ENCOUNTER — HOSPITAL ENCOUNTER (OUTPATIENT)
Age: 77
Setting detail: OUTPATIENT SURGERY
Discharge: HOME | End: 2022-10-04
Attending: OPHTHALMOLOGY | Admitting: OPHTHALMOLOGY
Payer: MEDICARE

## 2022-10-04 VITALS
SYSTOLIC BLOOD PRESSURE: 150 MMHG | DIASTOLIC BLOOD PRESSURE: 76 MMHG | HEIGHT: 64 IN | BODY MASS INDEX: 25.1 KG/M2 | HEART RATE: 67 BPM | TEMPERATURE: 97.8 F | RESPIRATION RATE: 17 BRPM | WEIGHT: 147 LBS | OXYGEN SATURATION: 96 %

## 2022-10-04 PROCEDURE — 76210000050 HC AMBSU PH II REC 0.5 TO 1 HR: Performed by: OPHTHALMOLOGY

## 2022-10-04 PROCEDURE — 77030006709 HC BLD OPHTH SSPC -B: Performed by: OPHTHALMOLOGY

## 2022-10-04 PROCEDURE — 74011250636 HC RX REV CODE- 250/636: Performed by: NURSE ANESTHETIST, CERTIFIED REGISTERED

## 2022-10-04 PROCEDURE — V2787 ASTIGMATISM-CORRECT FUNCTION: HCPCS | Performed by: OPHTHALMOLOGY

## 2022-10-04 PROCEDURE — 74011000250 HC RX REV CODE- 250: Performed by: OPHTHALMOLOGY

## 2022-10-04 PROCEDURE — 74011250636 HC RX REV CODE- 250/636: Performed by: OPHTHALMOLOGY

## 2022-10-04 PROCEDURE — 2709999900 HC NON-CHARGEABLE SUPPLY: Performed by: OPHTHALMOLOGY

## 2022-10-04 PROCEDURE — 76030000002 HC AMB SURG OR TIME FIRST 0.: Performed by: OPHTHALMOLOGY

## 2022-10-04 PROCEDURE — 76060000073 HC AMB SURG ANES FIRST 0.5 HR: Performed by: OPHTHALMOLOGY

## 2022-10-04 PROCEDURE — V2632 POST CHMBR INTRAOCULAR LENS: HCPCS | Performed by: OPHTHALMOLOGY

## 2022-10-04 PROCEDURE — 74011000250 HC RX REV CODE- 250

## 2022-10-04 DEVICE — IMPLANTABLE DEVICE: Type: IMPLANTABLE DEVICE | Site: EYE | Status: FUNCTIONAL

## 2022-10-04 RX ORDER — SODIUM CHLORIDE 9 MG/ML
INJECTION, SOLUTION INTRAVENOUS
Status: DISCONTINUED | OUTPATIENT
Start: 2022-10-04 | End: 2022-10-04 | Stop reason: HOSPADM

## 2022-10-04 RX ORDER — PROPOFOL 10 MG/ML
INJECTION, EMULSION INTRAVENOUS AS NEEDED
Status: DISCONTINUED | OUTPATIENT
Start: 2022-10-04 | End: 2022-10-04 | Stop reason: HOSPADM

## 2022-10-04 RX ORDER — PROPARACAINE HYDROCHLORIDE 5 MG/ML
1 SOLUTION/ DROPS OPHTHALMIC
Status: DISCONTINUED | OUTPATIENT
Start: 2022-10-04 | End: 2022-10-04 | Stop reason: HOSPADM

## 2022-10-04 RX ORDER — ONDANSETRON 2 MG/ML
4 INJECTION INTRAMUSCULAR; INTRAVENOUS AS NEEDED
Status: DISCONTINUED | OUTPATIENT
Start: 2022-10-04 | End: 2022-10-04 | Stop reason: HOSPADM

## 2022-10-04 RX ORDER — NEOMYCIN SULFATE, POLYMYXIN B SULFATE, AND DEXAMETHASONE 3.5; 10000; 1 MG/G; [USP'U]/G; MG/G
OINTMENT OPHTHALMIC 4 TIMES DAILY
Status: DISCONTINUED | OUTPATIENT
Start: 2022-10-04 | End: 2022-10-04 | Stop reason: HOSPADM

## 2022-10-04 RX ORDER — SODIUM CHLORIDE, SODIUM LACTATE, POTASSIUM CHLORIDE, CALCIUM CHLORIDE 600; 310; 30; 20 MG/100ML; MG/100ML; MG/100ML; MG/100ML
25 INJECTION, SOLUTION INTRAVENOUS CONTINUOUS
Status: DISCONTINUED | OUTPATIENT
Start: 2022-10-04 | End: 2022-10-04 | Stop reason: HOSPADM

## 2022-10-04 RX ORDER — SODIUM CHLORIDE 0.9 % (FLUSH) 0.9 %
5-40 SYRINGE (ML) INJECTION EVERY 8 HOURS
Status: DISCONTINUED | OUTPATIENT
Start: 2022-10-04 | End: 2022-10-04 | Stop reason: HOSPADM

## 2022-10-04 RX ORDER — DICLOFENAC SODIUM 1 MG/ML
1 SOLUTION/ DROPS OPHTHALMIC
Status: COMPLETED | OUTPATIENT
Start: 2022-10-04 | End: 2022-10-04

## 2022-10-04 RX ORDER — CYCLOPENTOLATE HYDROCHLORIDE 20 MG/ML
1 SOLUTION/ DROPS OPHTHALMIC
Status: COMPLETED | OUTPATIENT
Start: 2022-10-04 | End: 2022-10-04

## 2022-10-04 RX ORDER — TROPICAMIDE 10 MG/ML
SOLUTION/ DROPS OPHTHALMIC
Status: COMPLETED
Start: 2022-10-04 | End: 2022-10-04

## 2022-10-04 RX ORDER — SODIUM CHLORIDE 0.9 % (FLUSH) 0.9 %
5-40 SYRINGE (ML) INJECTION AS NEEDED
Status: DISCONTINUED | OUTPATIENT
Start: 2022-10-04 | End: 2022-10-04 | Stop reason: HOSPADM

## 2022-10-04 RX ORDER — SODIUM CHLORIDE 9 MG/ML
25 INJECTION, SOLUTION INTRAVENOUS ONCE
Status: COMPLETED | OUTPATIENT
Start: 2022-10-04 | End: 2022-10-04

## 2022-10-04 RX ORDER — DIPHENHYDRAMINE HYDROCHLORIDE 50 MG/ML
12.5 INJECTION, SOLUTION INTRAMUSCULAR; INTRAVENOUS AS NEEDED
Status: DISCONTINUED | OUTPATIENT
Start: 2022-10-04 | End: 2022-10-04 | Stop reason: HOSPADM

## 2022-10-04 RX ORDER — FENTANYL CITRATE 50 UG/ML
25 INJECTION, SOLUTION INTRAMUSCULAR; INTRAVENOUS
Status: DISCONTINUED | OUTPATIENT
Start: 2022-10-04 | End: 2022-10-04 | Stop reason: HOSPADM

## 2022-10-04 RX ORDER — PHENYLEPHRINE HYDROCHLORIDE 25 MG/ML
1 SOLUTION/ DROPS OPHTHALMIC
Status: COMPLETED | OUTPATIENT
Start: 2022-10-04 | End: 2022-10-04

## 2022-10-04 RX ORDER — TETRACAINE HYDROCHLORIDE 5 MG/ML
1 SOLUTION OPHTHALMIC
Status: DISCONTINUED | OUTPATIENT
Start: 2022-10-04 | End: 2022-10-04 | Stop reason: HOSPADM

## 2022-10-04 RX ORDER — TROPICAMIDE 10 MG/ML
1 SOLUTION/ DROPS OPHTHALMIC
Status: COMPLETED | OUTPATIENT
Start: 2022-10-04 | End: 2022-10-04

## 2022-10-04 RX ORDER — LIDOCAINE HYDROCHLORIDE 10 MG/ML
0.1 INJECTION, SOLUTION EPIDURAL; INFILTRATION; INTRACAUDAL; PERINEURAL AS NEEDED
Status: DISCONTINUED | OUTPATIENT
Start: 2022-10-04 | End: 2022-10-04 | Stop reason: HOSPADM

## 2022-10-04 RX ADMIN — PROPOFOL 50 MG: 10 INJECTION, EMULSION INTRAVENOUS at 12:05

## 2022-10-04 RX ADMIN — PHENYLEPHRINE HYDROCHLORIDE 1 DROP: 25 SOLUTION/ DROPS OPHTHALMIC at 11:21

## 2022-10-04 RX ADMIN — PROPARACAINE HYDROCHLORIDE 1 DROP: 5 SOLUTION/ DROPS OPHTHALMIC at 11:05

## 2022-10-04 RX ADMIN — TROPICAMIDE 1 DROP: 10 SOLUTION/ DROPS OPHTHALMIC at 11:11

## 2022-10-04 RX ADMIN — SODIUM CHLORIDE 25 ML/HR: 9 INJECTION, SOLUTION INTRAVENOUS at 11:02

## 2022-10-04 RX ADMIN — DICLOFENAC SODIUM 1 DROP: 1 SOLUTION/ DROPS OPHTHALMIC at 11:05

## 2022-10-04 RX ADMIN — TROPICAMIDE 1 DROP: 10 SOLUTION/ DROPS OPHTHALMIC at 11:05

## 2022-10-04 RX ADMIN — CYCLOPENTOLATE HYDROCHLORIDE 1 DROP: 20 SOLUTION/ DROPS OPHTHALMIC at 11:21

## 2022-10-04 RX ADMIN — DICLOFENAC SODIUM 1 DROP: 1 SOLUTION/ DROPS OPHTHALMIC at 11:11

## 2022-10-04 RX ADMIN — CYCLOPENTOLATE HYDROCHLORIDE 1 DROP: 20 SOLUTION/ DROPS OPHTHALMIC at 11:11

## 2022-10-04 RX ADMIN — SODIUM CHLORIDE: 0.9 INJECTION, SOLUTION INTRAVENOUS at 12:00

## 2022-10-04 RX ADMIN — CYCLOPENTOLATE HYDROCHLORIDE 1 DROP: 20 SOLUTION/ DROPS OPHTHALMIC at 11:05

## 2022-10-04 RX ADMIN — PHENYLEPHRINE HYDROCHLORIDE 1 DROP: 25 SOLUTION/ DROPS OPHTHALMIC at 11:05

## 2022-10-04 RX ADMIN — TROPICAMIDE 1 DROP: 10 SOLUTION/ DROPS OPHTHALMIC at 11:21

## 2022-10-04 RX ADMIN — PHENYLEPHRINE HYDROCHLORIDE 1 DROP: 25 SOLUTION/ DROPS OPHTHALMIC at 11:11

## 2022-10-04 RX ADMIN — DICLOFENAC SODIUM 1 DROP: 1 SOLUTION/ DROPS OPHTHALMIC at 11:21

## 2022-10-04 NOTE — ANESTHESIA PREPROCEDURE EVALUATION
Anesthetic History   No history of anesthetic complications            Review of Systems / Medical History  Patient summary reviewed, nursing notes reviewed and pertinent labs reviewed    Pulmonary  Within defined limits                 Neuro/Psych   Within defined limits           Cardiovascular              CAD and hyperlipidemia    Exercise tolerance: >4 METS  Comments: 01/22 EKG= SR, low volts    02/20 Stress ECHO negative    Ca++ score 128 LAD   GI/Hepatic/Renal  Within defined limits              Endo/Other            Comments: Thyroid Cyst/ nodules Other Findings   Comments: Cataracts         Physical Exam    Airway  Mallampati: IV  TM Distance: < 4 cm  Neck ROM: normal range of motion   Mouth opening: Normal     Cardiovascular    Rhythm: regular  Rate: normal         Dental  No notable dental hx       Pulmonary  Breath sounds clear to auscultation               Abdominal  GI exam deferred       Other Findings            Anesthetic Plan    ASA: 2  Anesthesia type: MAC          Induction: Intravenous  Anesthetic plan and risks discussed with: Patient

## 2022-10-04 NOTE — OP NOTES
Operative Note  . Operative Report    Patient: Nakul Coto MRN: [id number]  SSN: [id number]    YOB: 1945  Age: 78 y.o. Sex: female      Indications: The patient complains of painless progressive visual loss and reports difficulty with activities of daily living. Cataract surgery has been recommended to improve vision for activities of daily living, and the patient has elected to proceed. Date of Surgery: 10/4/2022     Preoperative Diagnosis: Combined form of age-related cataract, right eye [H25.811]    Postoperative Diagnosis: Combined form of age-related cataract, right eye [H25.811]    Surgeon(s) and Role:     * Xiomy Akbar MD - Primary     Anesthesia:  MAC     Procedure: Procedure(s):  RIGHT EYE PHACOEMULSIFICATION  WITH INTRAOCULAR LENS IMPLANT (MAC W/RETROBULBAR) toric    Procedure in Detail:  The surgeon marked the toric reference axis with the patient sitting upright. After informed consent was obtained, the patient was brought into the operating suite. MAC with sedation and a retrobulbar block using a 50/50 mixture of lidocaine 2% and Marcaine 0.75% with added Amphatase was administered without complication. The patient was prepped and draped in the usual sterile ophthalmic fashion. A wire lid speculum was placed. The steep toric axis was marked. A paracentesis was made using a 75 blade. The eye was filled with Provisc. A 2.8mm keratome was used to make a temporal clear corneal incision. A cystotome and Utrada forcep was used to make a continuous curvilinear capsulorrhexis without complication. Hydrodisection was performed until the nuclear rotated freely in the capsular bag. The cataract was removed using a two handed divide and conquer technique using a [place] as a second instrument. Irrigation/aspiration was used to remove the remaining cortex. Capsular polish was used as needed. The bag was inflated using Provisc.   An intraocular lens was placed into the bag without complication taking care to align the toric axis. Irrigation/aspiration was used to remove the viscoelastic. The chamber was filled with BSS and the wound hydrated. The wound was found to be watertight. Subconjunctival injections of ancef and decadron were given. The lid speculum and drape was removed. The eye was dressed with ointment, eye pads, and eye shield. The patient was taken to the recover room having tolerated the procedure well. Findings:  None         Estimated Blood Loss:  Minimal    Specimens: * No specimens in log *                Implants:   Implant Name Type Inv.  Item Serial No.  Lot No. LRB No. Used Action   LENS IO L13MM OD6MM +195 DIOPT 0DEG [id number] 1190 A CONSTANT - [id number]  LENS IO L13MM OD6MM +195 DIOPT 0DEG [id number] 1190 A CONSTANT [id number] [employer]WD NA Right 1 Implanted              Complications:  None

## 2022-10-04 NOTE — PERIOP NOTES
Pt tolerating liquids, vss.      1248-D/c instructions reviewed, all questions answered. Reviewed when to call the doctor,diet, activity and hygiene. Reviewed when to start the eye drops and when to take pain meds. 1302-Transported via w/c to awaiting transportation. No complaints noted at this time.

## 2022-10-04 NOTE — ANESTHESIA POSTPROCEDURE EVALUATION
Procedure(s):  RIGHT EYE PHACOEMULSIFICATION  WITH INTRAOCULAR LENS IMPLANT (MAC W/RETROBULBAR). MAC    Anesthesia Post Evaluation      Multimodal analgesia: multimodal analgesia used between 6 hours prior to anesthesia start to PACU discharge  Patient location during evaluation: PACU  Patient participation: complete - patient participated  Level of consciousness: awake and alert  Pain score: 0  Airway patency: patent  Anesthetic complications: no  Cardiovascular status: acceptable  Respiratory status: acceptable  Hydration status: acceptable  Post anesthesia nausea and vomiting:  none  Final Post Anesthesia Temperature Assessment:  Normothermia (36.0-37.5 degrees C)      INITIAL Post-op Vital signs:   Vitals Value Taken Time   /76 10/04/22 1253   Temp 36.6 °C (97.8 °F) 10/04/22 1246   Pulse 73 10/04/22 1254   Resp 19 10/04/22 1254   SpO2 96 % 10/04/22 1253   Vitals shown include unvalidated device data.

## 2022-10-04 NOTE — DISCHARGE INSTRUCTIONS
Dr. Linda Thompson and [address].  [address]  [phone]    Cataract Post Operative Instructions    Diet - you may eat a normal diet but avoid spicy or greasy tonight. Activity - Limit heavy lifting - do not lift more than 20 pounds for the next 7 days. Leave your eye patch on tonight. Your eye should remain closed under the patch. Your patch will be removed in Dr. Williams Stark office tomorrow. Most people do not have significant pain after cataract surgery. You may take any over-the-counter pain medication that you normally take as needed. You may resume all of your pre-operative medications. You should have your postoperative drops. If you do not[facility] You will start eyedrops TOMORROW. You have an appointment with Dr. Cleo Medellin tomorrow in her office. Date: ____10/05/2022____________ Time: _______9:45 am__________    If you need to speak to Dr. Cleo Medellin after business hours, please call [phone]. DO NOT TAKE SLEEPING MEDICATIONS OR ANTIANXIETY MEDICATIONS WHILE TAKING NARCOTIC PAIN MEDICATIONS,  ESPECIALLY THE NIGHT OF ANESTHESIA. CPAP PATIENTS BE SURE TO WEAR MACHINE WHENEVER NAPPING OR SLEEPING. DISCHARGE SUMMARY from Nurse    The following personal items collected during your admission are returned to you:   Dental Appliance: Dental Appliances: None  Vision: Visual Aid: Glasses  Hearing Aid:    Jewelry:    Clothing:    Other Valuables:    Valuables sent to safe:        PATIENT INSTRUCTIONS:    Anesthesia Discharge Instructions for Procedural Area requiring Sedation (MAC Anesthesia, Cath Lab, Endo and Radiology): You have been given medications during your procedure that may affect your memory and mental judgement for the next 24 hours.  During this time frame for your safety, please follow the instructions listed below :   Have a responsible adult to drive you home and be with you for at least 24 hours. Rest today and resume normal activities tomorrow. Start with a soft bland diet and advance as tolerated to your recommended diet. Do not drive any motor vehicle or operate mechanical or electrical equipment prior to [employer]. Avoid making critical decisions or signing legal documents prior to 6am tomorrow. Do not drink alcohol prior to 6am tomorrow. If you have sleep apnea and you plan to go home and take a nap, please use your CPAP machine not only at bedtime, but also while napping for 24 hours. If you notice any redness or swelling on parts of your body where IV medications were given, place a warm wet washcloth over the area for 20 minutes at a time until the redness or swelling goes away. If you still have redness or swelling after 2-3 days, please call us. You will receive a Post Operative Call from one of the Recovery Room Nurses on the day after your surgery to check on you. It is very important for us to know how you are recovering after your surgery. If you have an issue or need to speak with someone, please call your surgeon, do not wait for the post operative call. You may receive an e-mail or letter in the mail from [employer] regarding your experience with us in the Ambulatory Surgery Unit. Your feedback is valuable to us and we appreciate your participation in the survey. We wish you a speedy recovery ? What to do at Home:      *  Please give a list of your current medications to your Primary Care Provider. *  Please update this list whenever your medications are discontinued, doses are      changed, or new medications (including over-the-counter products) are added. *  Please carry medication information at all times in case of emergency situations. If you have not received your influenza and/or pneumococcal vaccine, please follow up with your primary care physician.       The discharge information has been reviewed with the patient and caregiver. The patient and caregiver verbalized understanding.

## 2022-10-04 NOTE — PERIOP NOTES
Marlys Bloodgood  1945  [id number]    Situation:  Verbal report given from: WEI Bales RN  Procedure: Procedure(s):  RIGHT EYE PHACOEMULSIFICATION  WITH INTRAOCULAR LENS IMPLANT (MAC W/RETROBULBAR)    Background:    Preoperative diagnosis: Combined form of age-related cataract, right eye [H25.811]    Postoperative diagnosis: Combined form of age-related cataract, right eye [H25.811]    :  Dr. Hussain Moseley    Assistant(s): Circ-1: Kaitlynn Hobson RN  Circ-2: Berlin Carrel, RN  Scrub Tech-1: Danna Vega L, RT    Specimens: * No specimens in log *    Assessment:  Intra-procedure medications         Anesthesia gave intra-procedure sedation and medications, see anesthesia flow sheet     Intravenous fluids: LR@ KVO     Vital signs stable       Recommendation:

## 2022-10-04 NOTE — PERIOP NOTES
Permission received to review discharge instructions and discuss private health information with Kirti Miranda, .

## 2022-10-04 NOTE — BRIEF OP NOTE
Brief Postoperative Note    Patient: Jaxson Hester  YOB: 1945  MRN: [id number]    Date of Procedure: 10/4/2022     Pre-Op Diagnosis: Combined form of age-related cataract, right eye [H25.811]    Post-Op Diagnosis: Same as preoperative diagnosis. Procedure(s):  RIGHT EYE PHACOEMULSIFICATION  WITH INTRAOCULAR LENS IMPLANT (MAC W/RETROBULBAR)    Surgeon(s):  Marilou Maradiaga MD    Surgical Assistant: None    Anesthesia: MAC     Estimated Blood Loss (mL): Minimal    Complications: None    Specimens: * No specimens in log *     Implants:   Implant Name Type Inv.  Item Serial No.  Lot No. LRB No. Used Action   LENS IO L13MM OD6MM +195 DIOPT 0DEG [id number] 1190 A CONSTANT - [id number]  LENS IO L13MM OD6MM +195 DIOPT 0DEG BCNVX 1190 A CONSTANT [id number] [employer]WD NA Right 1 Implanted       Drains: * No LDAs found *    Findings: none    Electronically Signed by Ana Lauren MD on 10/4/2022 at 2:38 PM

## 2022-10-12 RX ORDER — PREDNISOLONE ACETATE 10 MG/ML
SUSPENSION/ DROPS OPHTHALMIC
COMMUNITY
Start: 2022-09-15

## 2022-10-12 RX ORDER — MOXIFLOXACIN 5 MG/ML
SOLUTION/ DROPS OPHTHALMIC
COMMUNITY
Start: 2022-09-15

## 2022-10-12 NOTE — PERIOP NOTES
[facility]  Ambulatory Surgery Unit  [address]   [address]  [address]  Pre-operative Instructions    Surgery/Procedure Date  Tuesday October 18, 2022            Tentative Arrival Time TBD      1. On the day of your surgery/procedure, please report to the Ambulatory Surgery Unit Registration Desk and sign in at your designated time. The Ambulatory Surgery Unit is located in [facility] on the [facility] side of the [facility] across from the [address] [place] [address]. Please have all of your health insurance cards, co-payment, and a photo ID.    **TWO adults may accompany you the day of the procedure. We have limited seating available. If our waiting room is at capacity, your ride may be asked to remain in their vehicle. No one under 15 is allowed in the waiting room. 2. You must have someone with you to drive you home, as you should not drive a car for 24 hours following anesthesia. Please make arrangements for a responsible adult friend or family member to stay with you for at least the first 24 hours after your surgery. 3. Do not have anything to eat or drink (including water, gum, mints, coffee, juice) after 11:59 PM on Monday 10/17. This may not apply to medications prescribed by your physician. (Please note below the special instructions with medications to take the morning of surgery, if applicable.)    4. We recommend you do not drink any alcoholic beverages for 24 hours before and after your surgery. 5. Contact your surgeons office for instructions on the following medications: non-steroidal anti-inflammatory drugs (i.e. Advil, Aleve), vitamins, and supplements. (Some surgeons will want you to stop these medications prior to surgery and others may allow you to take them)   **If you are currently taking Plavix, Coumadin, Aspirin and/or other blood-thinning agents, contact your surgeon for instructions. ** Your surgeon will partner with the physician prescribing these medications to determine if it is safe to stop or if you need to continue taking. Please do not stop taking these medications without instructions from your surgeon. 6. In an effort to help prevent surgical site infection, we ask that you shower with an anti-bacterial soap (i.e. Dial/Safeguard, or the soap provided to you at your preadmission testing appointment) the night prior to and/or on the morning of surgery, using a fresh towel after each shower. (Please begin this process with fresh bed linens.) Do not apply any lotions, powders, or deodorants after the shower on the day of your procedure. If applicable, please do not shave the operative site for 48 hours prior to surgery. 7. Wear comfortable clothes. Wear glasses instead of contacts. Do not bring any jewelry or money (other than copays or fees as instructed). Do not wear make-up, particularly mascara, the morning of your surgery. Do not wear nail polish, particularly if you are having foot /hand surgery. Wear your hair loose or down, no ponytails, buns, olivia pins or clips. All body piercings must be removed. 8. You should understand that if you do not follow these instructions your surgery may be cancelled. If your physical condition changes (i.e. fever, cold or flu) please contact your surgeon as soon as possible. 9. It is important that you be on time. If a situation occurs where you may be late, or if you have any questions or problems, please call [phone].    10. Your surgery time may be subject to change. You will receive a phone call the day prior to surgery to confirm your arrival time. 11. Pediatric patients: please bring a change of clothes, diapers, bottle/sippy cup, pacifier, etc.      Special Instructions:     Take all medications and inhalers, as prescribed, on the morning of surgery with a sip of water      Insulin Dependent Diabetic patients: Take your diabetic medications as prescribed the day before surgery. Hold all diabetic medications the day of surgery. If you are scheduled to arrive for surgery after 8:00 AM, and your AM blood sugar is >200, please call Ambulatory Surgery. I understand a pre-operative phone call will be made to verify my surgery time. In the event that I am not available, I give permission for a message to be left on my answering service and/or with another person?       yes      Sending instructions to patients [facility] my chart account, patient verbalized understanding       ___________________      ___________________      ________________  (Signature of Patient)          (Witness)                   (Date and Time)

## 2022-10-17 ENCOUNTER — ANESTHESIA EVENT (OUTPATIENT)
Dept: SURGERY | Age: 77
End: 2022-10-17
Payer: MEDICARE

## 2022-10-18 ENCOUNTER — ANESTHESIA (OUTPATIENT)
Dept: SURGERY | Age: 77
End: 2022-10-18
Payer: MEDICARE

## 2022-10-18 ENCOUNTER — HOSPITAL ENCOUNTER (OUTPATIENT)
Age: 77
Setting detail: OUTPATIENT SURGERY
Discharge: HOME | End: 2022-10-18
Attending: OPHTHALMOLOGY | Admitting: OPHTHALMOLOGY
Payer: MEDICARE

## 2022-10-18 VITALS
BODY MASS INDEX: 25.27 KG/M2 | TEMPERATURE: 96.8 F | DIASTOLIC BLOOD PRESSURE: 79 MMHG | OXYGEN SATURATION: 98 % | RESPIRATION RATE: 18 BRPM | HEART RATE: 66 BPM | SYSTOLIC BLOOD PRESSURE: 154 MMHG | HEIGHT: 64 IN | WEIGHT: 148 LBS

## 2022-10-18 PROCEDURE — 76060000073 HC AMB SURG ANES FIRST 0.5 HR: Performed by: OPHTHALMOLOGY

## 2022-10-18 PROCEDURE — 76210000040 HC AMBSU PH I REC FIRST 0.5 HR: Performed by: OPHTHALMOLOGY

## 2022-10-18 PROCEDURE — 2709999900 HC NON-CHARGEABLE SUPPLY: Performed by: OPHTHALMOLOGY

## 2022-10-18 PROCEDURE — V2630 ANTER CHAMBER INTRAOCUL LENS: HCPCS | Performed by: OPHTHALMOLOGY

## 2022-10-18 PROCEDURE — 74011250636 HC RX REV CODE- 250/636: Performed by: OPHTHALMOLOGY

## 2022-10-18 PROCEDURE — V2787 ASTIGMATISM-CORRECT FUNCTION: HCPCS | Performed by: OPHTHALMOLOGY

## 2022-10-18 PROCEDURE — 74011000250 HC RX REV CODE- 250: Performed by: NURSE ANESTHETIST, CERTIFIED REGISTERED

## 2022-10-18 PROCEDURE — 76030000002 HC AMB SURG OR TIME FIRST 0.: Performed by: OPHTHALMOLOGY

## 2022-10-18 PROCEDURE — 74011000250 HC RX REV CODE- 250: Performed by: OPHTHALMOLOGY

## 2022-10-18 PROCEDURE — 76210000046 HC AMBSU PH II REC FIRST 0.5 HR: Performed by: OPHTHALMOLOGY

## 2022-10-18 PROCEDURE — 74011250636 HC RX REV CODE- 250/636: Performed by: NURSE ANESTHETIST, CERTIFIED REGISTERED

## 2022-10-18 PROCEDURE — 77030006709 HC BLD OPHTH SSPC -B: Performed by: OPHTHALMOLOGY

## 2022-10-18 PROCEDURE — 74011000250 HC RX REV CODE- 250

## 2022-10-18 DEVICE — ACRYSOF(R) IQ TORIC ASTIGMATISM IOL, SINGLE-PIECE ACRYLIC FOLDABLE LENS, UV WITH BLUE LIGHTFILTER, 13.0MM LENGTH, 6.0MM BICONVEX TORICASPHERIC OPTIC, 2.25 D CYLINDER.
Type: IMPLANTABLE DEVICE | Site: EYE | Status: FUNCTIONAL
Brand: ACRYSOF®

## 2022-10-18 RX ORDER — DICLOFENAC SODIUM 1 MG/ML
1 SOLUTION/ DROPS OPHTHALMIC
Status: COMPLETED | OUTPATIENT
Start: 2022-10-18 | End: 2022-10-18

## 2022-10-18 RX ORDER — PROPOFOL 10 MG/ML
INJECTION, EMULSION INTRAVENOUS AS NEEDED
Status: DISCONTINUED | OUTPATIENT
Start: 2022-10-18 | End: 2022-10-18 | Stop reason: HOSPADM

## 2022-10-18 RX ORDER — SODIUM CHLORIDE, SODIUM LACTATE, POTASSIUM CHLORIDE, CALCIUM CHLORIDE 600; 310; 30; 20 MG/100ML; MG/100ML; MG/100ML; MG/100ML
25 INJECTION, SOLUTION INTRAVENOUS CONTINUOUS
Status: DISCONTINUED | OUTPATIENT
Start: 2022-10-18 | End: 2022-10-18 | Stop reason: HOSPADM

## 2022-10-18 RX ORDER — ONDANSETRON 2 MG/ML
4 INJECTION INTRAMUSCULAR; INTRAVENOUS AS NEEDED
Status: DISCONTINUED | OUTPATIENT
Start: 2022-10-18 | End: 2022-10-18 | Stop reason: HOSPADM

## 2022-10-18 RX ORDER — LIDOCAINE HYDROCHLORIDE 10 MG/ML
0.1 INJECTION, SOLUTION EPIDURAL; INFILTRATION; INTRACAUDAL; PERINEURAL AS NEEDED
Status: DISCONTINUED | OUTPATIENT
Start: 2022-10-18 | End: 2022-10-18 | Stop reason: HOSPADM

## 2022-10-18 RX ORDER — SODIUM CHLORIDE 0.9 % (FLUSH) 0.9 %
5-40 SYRINGE (ML) INJECTION AS NEEDED
Status: DISCONTINUED | OUTPATIENT
Start: 2022-10-18 | End: 2022-10-18 | Stop reason: HOSPADM

## 2022-10-18 RX ORDER — PROPARACAINE HYDROCHLORIDE 5 MG/ML
1 SOLUTION/ DROPS OPHTHALMIC
Status: COMPLETED | OUTPATIENT
Start: 2022-10-18 | End: 2022-10-18

## 2022-10-18 RX ORDER — PHENYLEPHRINE HYDROCHLORIDE 25 MG/ML
1 SOLUTION/ DROPS OPHTHALMIC
Status: COMPLETED | OUTPATIENT
Start: 2022-10-18 | End: 2022-10-18

## 2022-10-18 RX ORDER — SODIUM CHLORIDE 9 MG/ML
25 INJECTION, SOLUTION INTRAVENOUS ONCE
Status: COMPLETED | OUTPATIENT
Start: 2022-10-18 | End: 2022-10-18

## 2022-10-18 RX ORDER — FENTANYL CITRATE 50 UG/ML
25 INJECTION, SOLUTION INTRAMUSCULAR; INTRAVENOUS
Status: DISCONTINUED | OUTPATIENT
Start: 2022-10-18 | End: 2022-10-18 | Stop reason: HOSPADM

## 2022-10-18 RX ORDER — TROPICAMIDE 10 MG/ML
SOLUTION/ DROPS OPHTHALMIC
Status: COMPLETED
Start: 2022-10-18 | End: 2022-10-18

## 2022-10-18 RX ORDER — DIPHENHYDRAMINE HYDROCHLORIDE 50 MG/ML
12.5 INJECTION, SOLUTION INTRAMUSCULAR; INTRAVENOUS AS NEEDED
Status: DISCONTINUED | OUTPATIENT
Start: 2022-10-18 | End: 2022-10-18 | Stop reason: HOSPADM

## 2022-10-18 RX ORDER — NEOMYCIN SULFATE, POLYMYXIN B SULFATE, AND DEXAMETHASONE 3.5; 10000; 1 MG/G; [USP'U]/G; MG/G
OINTMENT OPHTHALMIC ONCE
Status: DISCONTINUED | OUTPATIENT
Start: 2022-10-18 | End: 2022-10-18 | Stop reason: HOSPADM

## 2022-10-18 RX ORDER — SODIUM CHLORIDE 0.9 % (FLUSH) 0.9 %
5-40 SYRINGE (ML) INJECTION EVERY 8 HOURS
Status: DISCONTINUED | OUTPATIENT
Start: 2022-10-18 | End: 2022-10-18 | Stop reason: HOSPADM

## 2022-10-18 RX ORDER — CYCLOPENTOLATE HYDROCHLORIDE 20 MG/ML
1 SOLUTION/ DROPS OPHTHALMIC
Status: COMPLETED | OUTPATIENT
Start: 2022-10-18 | End: 2022-10-18

## 2022-10-18 RX ORDER — NEOMYCIN SULFATE, POLYMYXIN B SULFATE, AND DEXAMETHASONE 3.5; 10000; 1 MG/G; [USP'U]/G; MG/G
OINTMENT OPHTHALMIC AS NEEDED
Status: DISCONTINUED | OUTPATIENT
Start: 2022-10-18 | End: 2022-10-18 | Stop reason: HOSPADM

## 2022-10-18 RX ORDER — TROPICAMIDE 10 MG/ML
1 SOLUTION/ DROPS OPHTHALMIC
Status: COMPLETED | OUTPATIENT
Start: 2022-10-18 | End: 2022-10-18

## 2022-10-18 RX ORDER — LIDOCAINE HYDROCHLORIDE 20 MG/ML
INJECTION, SOLUTION EPIDURAL; INFILTRATION; INTRACAUDAL; PERINEURAL AS NEEDED
Status: DISCONTINUED | OUTPATIENT
Start: 2022-10-18 | End: 2022-10-18 | Stop reason: HOSPADM

## 2022-10-18 RX ORDER — SODIUM CHLORIDE 9 MG/ML
INJECTION, SOLUTION INTRAVENOUS
Status: DISCONTINUED | OUTPATIENT
Start: 2022-10-18 | End: 2022-10-18 | Stop reason: HOSPADM

## 2022-10-18 RX ADMIN — PHENYLEPHRINE HYDROCHLORIDE 1 DROP: 25 SOLUTION/ DROPS OPHTHALMIC at 07:26

## 2022-10-18 RX ADMIN — CYCLOPENTOLATE HYDROCHLORIDE 1 DROP: 20 SOLUTION/ DROPS OPHTHALMIC at 07:15

## 2022-10-18 RX ADMIN — SODIUM CHLORIDE 25 ML/HR: 9 INJECTION, SOLUTION INTRAVENOUS at 07:14

## 2022-10-18 RX ADMIN — PROPOFOL 50 MG: 10 INJECTION, EMULSION INTRAVENOUS at 08:16

## 2022-10-18 RX ADMIN — TROPICAMIDE 1 DROP: 10 SOLUTION/ DROPS OPHTHALMIC at 07:21

## 2022-10-18 RX ADMIN — DICLOFENAC SODIUM 1 DROP: 1 SOLUTION/ DROPS OPHTHALMIC at 07:15

## 2022-10-18 RX ADMIN — CYCLOPENTOLATE HYDROCHLORIDE 1 DROP: 20 SOLUTION/ DROPS OPHTHALMIC at 07:21

## 2022-10-18 RX ADMIN — PROPARACAINE HYDROCHLORIDE 1 DROP: 5 SOLUTION/ DROPS OPHTHALMIC at 07:26

## 2022-10-18 RX ADMIN — PHENYLEPHRINE HYDROCHLORIDE 1 DROP: 25 SOLUTION/ DROPS OPHTHALMIC at 07:15

## 2022-10-18 RX ADMIN — CYCLOPENTOLATE HYDROCHLORIDE 1 DROP: 20 SOLUTION/ DROPS OPHTHALMIC at 07:26

## 2022-10-18 RX ADMIN — TROPICAMIDE 1 DROP: 10 SOLUTION/ DROPS OPHTHALMIC at 07:15

## 2022-10-18 RX ADMIN — DICLOFENAC SODIUM 1 DROP: 1 SOLUTION/ DROPS OPHTHALMIC at 07:26

## 2022-10-18 RX ADMIN — PROPARACAINE HYDROCHLORIDE 1 DROP: 5 SOLUTION/ DROPS OPHTHALMIC at 07:15

## 2022-10-18 RX ADMIN — TROPICAMIDE 1 DROP: 10 SOLUTION/ DROPS OPHTHALMIC at 07:26

## 2022-10-18 RX ADMIN — SODIUM CHLORIDE: 0.9 INJECTION, SOLUTION INTRAVENOUS at 08:10

## 2022-10-18 RX ADMIN — LIDOCAINE HYDROCHLORIDE 50 MG: 20 INJECTION, SOLUTION EPIDURAL; INFILTRATION; INTRACAUDAL; PERINEURAL at 08:16

## 2022-10-18 RX ADMIN — DICLOFENAC SODIUM 1 DROP: 1 SOLUTION/ DROPS OPHTHALMIC at 07:21

## 2022-10-18 RX ADMIN — PHENYLEPHRINE HYDROCHLORIDE 1 DROP: 25 SOLUTION/ DROPS OPHTHALMIC at 07:21

## 2022-10-18 NOTE — DISCHARGE INSTRUCTIONS
Dr. Hernan Callahan and [address].  [address]  [phone]    Cataract Post Operative Instructions    Diet - you may eat a normal diet but avoid spicy or greasy tonight. Activity - Limit heavy lifting - do not lift more than 20 pounds for the next 7 days. Leave your eye patch on tonight. Your eye should remain closed under the patch. Your patch will be removed in Dr. Buffy Sharma office tomorrow. Most people do not have significant pain after cataract surgery. You may take any over-the-counter pain medication that you normally take as needed. You may resume all of your pre-operative medications. You should have your postoperative drops. If you do not[facility] You will start eyedrops TOMORROW. You have an appointment with Dr. Patricia Cuadra tomorrow in her office. Date: ________________ Time: _________________    If you need to speak to Dr. Patricia Cuadra after business hours, please call [phone]. DO NOT TAKE SLEEPING MEDICATIONS OR ANTIANXIETY MEDICATIONS WHILE TAKING NARCOTIC PAIN MEDICATIONS,  ESPECIALLY THE NIGHT OF ANESTHESIA. CPAP PATIENTS BE SURE TO WEAR MACHINE WHENEVER NAPPING OR SLEEPING. DISCHARGE SUMMARY from Nurse    The following personal items collected during your admission are returned to you:   Dental Appliance: Dental Appliances: None  Vision: Visual Aid: None  Hearing Aid:    Jewelry: Jewelry: None  Clothing: Clothing: With patient  Other Valuables: Other Valuables: None  Valuables sent to safe:        PATIENT INSTRUCTIONS:    Anesthesia Discharge Instructions for Procedural Area requiring Sedation (MAC Anesthesia, Cath Lab, Endo and Radiology): You have been given medications during your procedure that may affect your memory and mental judgement for the next 24 hours.  During this time frame for your safety, please follow the instructions listed below :   Have a responsible adult to drive you home and be with you for at least 24 hours. Rest today and resume normal activities tomorrow. Start with a soft bland diet and advance as tolerated to your recommended diet. Do not drive any motor vehicle or operate mechanical or electrical equipment prior to [employer]. Avoid making critical decisions or signing legal documents prior to 6am tomorrow. Do not drink alcohol prior to 6am tomorrow. If you have sleep apnea and you plan to go home and take a nap, please use your CPAP machine not only at bedtime, but also while napping for 24 hours. If you notice any redness or swelling on parts of your body where IV medications were given, place a warm wet washcloth over the area for 20 minutes at a time until the redness or swelling goes away. If you still have redness or swelling after 2-3 days, please call us. You will receive a Post Operative Call from one of the Recovery Room Nurses on the day after your surgery to check on you. It is very important for us to know how you are recovering after your surgery. If you have an issue or need to speak with someone, please call your surgeon, do not wait for the post operative call. You may receive an e-mail or letter in the mail from [employer] regarding your experience with us in the Ambulatory Surgery Unit. Your feedback is valuable to us and we appreciate your participation in the survey. We wish you a speedy recovery ? What to do at Home:      *  Please give a list of your current medications to your Primary Care Provider. *  Please update this list whenever your medications are discontinued, doses are      changed, or new medications (including over-the-counter products) are added. *  Please carry medication information at all times in case of emergency situations.                   If you have not received your influenza and/or pneumococcal vaccine, please follow up with your primary care physician. The discharge information has been reviewed with the patient and caregiver. The patient and caregiver verbalized understanding.

## 2022-10-18 NOTE — ANESTHESIA POSTPROCEDURE EVALUATION
Procedure(s):  LEFT EYE PHACOEMULSIFICATION WITH INTRAOCULAR LENS IMPLANT (MAC W/RETROBULBAR).     MAC    Anesthesia Post Evaluation      Multimodal analgesia: multimodal analgesia used between 6 hours prior to anesthesia start to PACU discharge  Patient location during evaluation: PACU  Patient participation: complete - patient participated  Level of consciousness: awake and alert  Pain score: 0  Airway patency: patent  Anesthetic complications: no  Cardiovascular status: acceptable  Respiratory status: acceptable  Hydration status: acceptable  Post anesthesia nausea and vomiting:  none  Final Post Anesthesia Temperature Assessment:  Normothermia (36.0-37.5 degrees C)      INITIAL Post-op Vital signs:   Vitals Value Taken Time   /92 10/18/22 0845   Temp 36.7 °C (98 °F) 10/18/22 0842   Pulse 68 10/18/22 0845   Resp 16 10/18/22 0845   SpO2 96 % 10/18/22 0845

## 2022-10-18 NOTE — BRIEF OP NOTE
Brief Postoperative Note    Patient: Nidia Jimenez  YOB: 1945  MRN: [id number]    Date of Procedure: 10/18/2022     Pre-Op Diagnosis: Combined form of age-related cataract, left eye [H25.812]    Post-Op Diagnosis: Same as preoperative diagnosis. Procedure(s):  LEFT EYE PHACOEMULSIFICATION WITH INTRAOCULAR LENS IMPLANT (MAC W/RETROBULBAR)    Surgeon(s):  Keisha York MD    Surgical Assistant: None    Anesthesia: MAC     Estimated Blood Loss (mL): Minimal    Complications: None    Specimens: * No specimens in log *     Implants:   Implant Name Type Inv.  Item Serial No.  Lot No. LRB No. Used Action   LENS ACRYSF IQ TOR4 ASTGM 22.0 -- [employer]YSOF - [id number] Other LENS ACRYSF IQ TOR4 ASTGM 22.0 -- [employer] [id number] [employer] N/A Left 1 Implanted       Drains: * No LDAs found *    Findings: none    Electronically Signed by Amalia Gibbs MD on 10/18/2022 at 8:44 AM

## 2022-10-18 NOTE — PERIOP NOTES
Permission received to review discharge instructions and discuss private health information with , Loyda Kimbrough. Patient states that family/friend will be with them for at least 24 hours following today's procedure.

## 2022-10-18 NOTE — PERIOP NOTES
Patient received to PACU, VSS. Patient awake and alert with no complaints of pain. Dressing to left eye intact. 3369: Patient tolerating liquids without issue. Discharge instructions given. Patient and  verbalize understanding of instructions and follow up appointment. Patient and  state ready for discharge. IV removed. Patient discharged at this time by wheelchair with belongings,  to provide transportation home.

## 2022-10-18 NOTE — PERIOP NOTES
Maribel \[facility]\""  1945  [id number]    Situation:  Verbal report given from: Morteza Faria RN and Gurdeep Sanchez CRNA  Procedure: Procedure(s):  LEFT EYE PHACOEMULSIFICATION WITH INTRAOCULAR LENS IMPLANT (MAC W/RETROBULBAR)    Background:    Preoperative diagnosis: Combined form of age-related cataract, left eye [H25.812]    Postoperative diagnosis: Combined form of age-related cataract, left eye [H25.812]    :  Dr. Koffi Verduzco): Circ-1: Riya Rose RN  Circ-2: Mateo Lerma RN; Sanjuanita Gallardo RN  Scrub Tech-1: Braxton OWENS RT    Specimens: * No specimens in log *    Assessment:  Intra-procedure medications         Anesthesia gave intra-procedure sedation and medications, see anesthesia flow sheet     Intravenous fluids: LR@ KVO     Vital signs stable       Recommendation:    Permission to share finding with  : yes

## 2023-01-12 ENCOUNTER — TRANSCRIBE ORDER (OUTPATIENT)
Dept: SCHEDULING | Age: 78
End: 2023-01-12

## 2023-01-12 DIAGNOSIS — Z12.31 SCREENING MAMMOGRAM FOR BREAST CANCER: Primary | ICD-10-CM

## 2023-01-13 ENCOUNTER — TRANSCRIBE ORDER (OUTPATIENT)
Dept: SCHEDULING | Age: 78
End: 2023-01-13

## 2023-01-13 DIAGNOSIS — Z78.9 NON-SMOKER: ICD-10-CM

## 2023-01-13 DIAGNOSIS — E04.1 THYROID NODULE: Primary | ICD-10-CM

## 2023-01-13 DIAGNOSIS — Z00.8 OTHER SPECIFIED GENERAL MEDICAL EXAMINATION: ICD-10-CM

## 2023-01-25 ENCOUNTER — HOSPITAL ENCOUNTER (OUTPATIENT)
Dept: ULTRASOUND IMAGING | Age: 78
Discharge: HOME | End: 2023-01-25
Attending: OTOLARYNGOLOGY
Payer: MEDICARE

## 2023-01-25 ENCOUNTER — HOSPITAL ENCOUNTER (OUTPATIENT)
Dept: MAMMOGRAPHY | Age: 78
Discharge: HOME | End: 2023-01-25
Attending: OBSTETRICS & GYNECOLOGY
Payer: MEDICARE

## 2023-01-25 DIAGNOSIS — Z00.8 OTHER SPECIFIED GENERAL MEDICAL EXAMINATION: ICD-10-CM

## 2023-01-25 DIAGNOSIS — Z78.9 NON-SMOKER: ICD-10-CM

## 2023-01-25 DIAGNOSIS — E04.1 THYROID NODULE: ICD-10-CM

## 2023-01-25 DIAGNOSIS — Z12.31 SCREENING MAMMOGRAM FOR BREAST CANCER: ICD-10-CM

## 2023-01-25 PROCEDURE — 77063 BREAST TOMOSYNTHESIS BI: CPT

## 2023-01-25 PROCEDURE — 76536 US EXAM OF HEAD AND NECK: CPT

## 2023-02-15 DIAGNOSIS — E78.89 LIPIDS ABNORMAL: ICD-10-CM

## 2023-02-16 RX ORDER — ATORVASTATIN CALCIUM 10 MG/1
TABLET, FILM COATED ORAL
Qty: 90 TABLET | Refills: 0 | Status: SHIPPED | OUTPATIENT
Start: 2023-02-16

## 2023-02-16 NOTE — TELEPHONE ENCOUNTER
Cardiologist: Dr. Arpit Royal    Last appt: 1/27/2022  Future Appointments   Date Time Provider Emma Lisa   3/21/2023  1:40 PM MD TRACY Bedoya BS AMB       Requested Prescriptions     Signed Prescriptions Disp Refills    atorvastatin (LIPITOR) 10 mg tablet 90 Tablet 0     Sig: TAKE 1 TABLET DAILY     Authorizing Provider: Ruddy Mann     Ordering User: TAYLER YANCEY         Refsalty VO per Dr. Arpit Royal.

## 2023-03-01 ENCOUNTER — TELEPHONE (OUTPATIENT)
Dept: CARDIOLOGY CLINIC | Age: 78
End: 2023-03-01

## 2023-03-01 DIAGNOSIS — I25.119 CORONARY ARTERY DISEASE WITH ANGINA PECTORIS, UNSPECIFIED VESSEL OR LESION TYPE, UNSPECIFIED WHETHER NATIVE OR TRANSPLANTED HEART (HCC): Primary | ICD-10-CM

## 2023-03-01 NOTE — TELEPHONE ENCOUNTER
Patient would like to know if she needs labs for her next appt on 3/21/23.  Please call [phone]

## 2023-03-01 NOTE — TELEPHONE ENCOUNTER
LM to advise FASTING lab order was placed. Can have drawn without lab slip at a [facility] site or we can fax or mail them somewhere for her, just call back and let us know.

## 2023-03-21 ENCOUNTER — OFFICE VISIT (OUTPATIENT)
Dept: CARDIOLOGY CLINIC | Age: 78
End: 2023-03-21
Payer: MEDICARE

## 2023-03-21 VITALS
RESPIRATION RATE: 18 BRPM | HEIGHT: 63 IN | BODY MASS INDEX: 25.87 KG/M2 | HEART RATE: 72 BPM | DIASTOLIC BLOOD PRESSURE: 78 MMHG | OXYGEN SATURATION: 97 % | SYSTOLIC BLOOD PRESSURE: 130 MMHG | WEIGHT: 146 LBS

## 2023-03-21 DIAGNOSIS — I25.119 CORONARY ARTERY DISEASE WITH ANGINA PECTORIS, UNSPECIFIED VESSEL OR LESION TYPE, UNSPECIFIED WHETHER NATIVE OR TRANSPLANTED HEART (HCC): Primary | ICD-10-CM

## 2023-03-21 PROCEDURE — 1090F PRES/ABSN URINE INCON ASSESS: CPT | Performed by: SPECIALIST

## 2023-03-21 PROCEDURE — G8510 SCR DEP NEG, NO PLAN REQD: HCPCS | Performed by: SPECIALIST

## 2023-03-21 PROCEDURE — 93005 ELECTROCARDIOGRAM TRACING: CPT | Performed by: SPECIALIST

## 2023-03-21 PROCEDURE — 93010 ELECTROCARDIOGRAM REPORT: CPT | Performed by: SPECIALIST

## 2023-03-21 PROCEDURE — 99214 OFFICE O/P EST MOD 30 MIN: CPT | Performed by: SPECIALIST

## 2023-03-21 PROCEDURE — G8536 NO DOC ELDER MAL SCRN: HCPCS | Performed by: SPECIALIST

## 2023-03-21 PROCEDURE — 1123F ACP DISCUSS/DSCN MKR DOCD: CPT | Performed by: SPECIALIST

## 2023-03-21 PROCEDURE — G8427 DOCREV CUR MEDS BY ELIG CLIN: HCPCS | Performed by: SPECIALIST

## 2023-03-21 PROCEDURE — 3017F COLORECTAL CA SCREEN DOC REV: CPT | Performed by: SPECIALIST

## 2023-03-21 PROCEDURE — G8399 PT W/DXA RESULTS DOCUMENT: HCPCS | Performed by: SPECIALIST

## 2023-03-21 PROCEDURE — G0463 HOSPITAL OUTPT CLINIC VISIT: HCPCS | Performed by: SPECIALIST

## 2023-03-21 PROCEDURE — 1101F PT FALLS ASSESS-DOCD LE1/YR: CPT | Performed by: SPECIALIST

## 2023-03-21 PROCEDURE — G8417 CALC BMI ABV UP PARAM F/U: HCPCS | Performed by: SPECIALIST

## 2023-03-21 NOTE — PROGRESS NOTES
Visit Vitals  BP (!) 140/80   Pulse 72   Resp 18   Ht 5' 3\" (1.6 m)   Wt 146 lb (66.2 kg)   SpO2 97%   BMI 25.86 kg/m²

## 2023-03-21 NOTE — PROGRESS NOTES
[facility]                                                            OFFICE NOTE        Alessio Ambrocio M.D.,KASSANDRA Venkata Landa   1945  [id number]    Date/Time:  3/21/01687:24 PM            SUBJECTIVE:  She is doing very well no cp or sob or palpitations reported   She remains active at the [facility]       Assessment/Plan    1. Coronary disease: She does have subclinical coronary artery disease. Last calcium score of 2022 was 234 increased from 128 . She remains completely asymptomatic. Her EKG shows a normal sinus rhythm without significant ST-T changes. Her last stress test was in essence normal and there was a stress echocardiogram 2020. Continue statin and aspirin at this time. No additional cardiac interventions at least for now. 2.  Hyperlipidemia: Continue with Lipitor good panel as of 2023. 3.  Hypertension: Well-controlled. No additional interventions for now we will see her back in 1 year or sooner if any question problems arise prior to that. Lab Results   Component Value Date/Time    Cholesterol, total 135 2023 10:24 AM    HDL Cholesterol 49 2023 10:24 AM    LDL, calculated 68 2023 10:24 AM    LDL, calculated 65 2020 08:07 AM    VLDL, calculated 18 2023 10:24 AM    VLDL, calculated 14 2020 08:07 AM    Triglyceride 99 2023 10:24 AM     Lab Results   Component Value Date/Time    ALT (SGPT) 17 2023 10:24 AM    AST (SGOT) 23 2023 10:24 AM    Alk. phosphatase 71 2023 10:24 AM    Bilirubin, direct 0.16 2023 10:24 AM    Bilirubin, total 0.5 2023 10:24 AM              HPI   78 y.o. female. She has no h/o HTN, DM she does have bordeline increase in cholesterol.  Seen Here for elevated ca score  Ca score on :  Left main: 0  Left anterior descendin  Left circumflex: 0  Right coronary: 0  Posterior descendin    Total calcium score: 128    Ca score : 234     Stress echo on 10/12 no ischemia or mi and EF at rest 60%     PMH: urinary incontinence  PSH : as above  SH: no tobacco, quit in , occasional etoh, retired  FH: not significant for early CAD              CARDIAC STUDIES      In  ca score:Left main coronary artery: 0   Left anterior descending coronary artery: 192  Left circumflex coronary artery: 6  Right coronary artery: 36     Total calcium score: 234       20    ECHO STRESS 2020    Interpretation Summary  · Baseline ECG: Normal sinus rhythm. · Moderate risk Duke treadmill score. · Negative stress test.  · Normal stress echocardiogram. Low risk study. Signed by: Jakub Braden MD on 2020  1:54 PM                              EKG Results       Procedure 720 Value Units Date/Time    AMB POC EKG ROUTINE W/ 12 LEADS, INTER & REP [[id number]] Resulted: 23 1401    Order Status: Completed Updated: 23 1411                IMAGING      MRI Results (most recent):  No results found for this or any previous visit. CT Results (most recent):  Results from Hospital Encounter encounter on 22    CT HEART W/O CONT WITH CALCIUM    Narrative  EXAM:  CT HEART W/O CONT WITH CALCIUM    INDICATION: . Screening. COMPARISON: None. TECHNIQUE: CT of the heart was performed without contrast. Quantitative coronary  artery CT calcium scoring was performed. CT dose reduction was achieved through  use of a standardized protocol tailored for this examination and automatic  exposure control for dose modulation.     FINDINGS:  The coronary calcium score in each vessel is as follows:    Left main coronary artery: 0  Left anterior descending coronary artery: 192  Left circumflex coronary artery: 6  Right coronary artery: 36    Total calcium score: 234    Calcium score interpretation:  0-0 = No evidence of coronary artery disease (CAD). 1-10 = Minimal evidence of CAD   = Mild evidence of CAD  101-400 = Moderate evidence of CAD  >400 = Extensive evidence of CAD    Your score of 234 places you in the 70 percentile rank. That means that 30% of  women from 67-66 years old will have a higher calcium score than you. Chest CT findings: Centrilobular emphysema is mild to moderate. . The entire lung  fields are not imaged on this examination. Impression  1. Total calcium score of 234. Moderate coronary artery disease, concentrated in  the LAD. 2. Mild to moderate emphysema. 3. Annual low dose chest CT is recommended for lung carcinoma screening if the  patient meets criteria. Criteria for lung carcinoma screening with low-dose CT:  3 49-80 years old. 2. No signs or symptoms of lung carcinoma. 3. 20 pack-year or greater smoking history. 4. Current smoker or quit within the last 15 years. 5. Written order from a health professional following lung cancer screening  counseling that attests to shared decision-making having taken place before  their first screening CT.    www.acr.org/Quality-Safety/Resources/LungRADS  www. [employer]prevent[employer]ervic[employer]askforce.org/uspstf/recommendation/lung-cancer-screen    ng      XR Results (most recent):  Results from Hospital Encounter encounter on 08/10/18    XR CHEST PA LAT    Narrative  INDICATION: chronic cough    EXAM: CXR 2 View    FINDINGS: Frontal and lateral views of the chest show clear lungs. Heart size is  normal. There is no pulmonary edema. There is no evident pneumothorax,  adenopathy or effusion. Impression  IMPRESSION: Normal two view chest x-ray.           Past Medical History:   Diagnosis Date    High cholesterol     Ill-defined condition     Over active bladder    Menopause 2007    Shingles     Thyroid disease     cyst, biopsy david     Past Surgical History:   Procedure Laterality Date    COLONOSCOPY N/A 10/30/2018    COLONOSCOPY performed by Gala Gilliland, MD at [facility] ENDOSCOPY    COLONOSCOPY,REMV STEFANYN,SNARE  10/30/2018         COLORECTAL SCRN; HI RISK IND  10/30/2018         HX CATARACT REMOVAL Right 10/2004    HX COLONOSCOPY      HX OTHER SURGICAL      thyroid cyst, biopsy    GERMANIA BIOPSY BREAST STEREOTACTIC Left     benign bx     Social History     Tobacco Use    Smoking status: Former     Packs/day: 1.00     Years: 10.00     Pack years: 10.00     Types: Cigarettes     Quit date: 1999     Years since quittin.9    Smokeless tobacco: Never   Substance Use Topics    Alcohol use: Yes     Alcohol/week: 0.0 standard drinks     Comment: one glass wine a week    Drug use: No     Family History   Problem Relation Age of Onset    Breast Cancer Paternal Grandmother     Stroke Mother     Colon Cancer Father     Hypertension Sister     COPD Sister     High Cholesterol Sister      No Known Allergies      Visit Vitals  /78   Pulse 72   Resp 18   Ht 5' 3\" (1.6 m)   Wt 146 lb (66.2 kg)   SpO2 97%   BMI 25.86 kg/m²         Last 3 Recorded Weights in this Encounter    23 1400   Weight: 146 lb (66.2 kg)            Review of Systems:   Pertinent items are noted in the History of Present Illness. Visit Vitals  /78   Pulse 72   Resp 18   Ht 5' 3\" (1.6 m)   Wt 146 lb (66.2 kg)   SpO2 97%   BMI 25.86 kg/m²     General Appearance:  Well developed, well nourished,alert and oriented x 3, and individual in no acute distress. Ears/Nose/Mouth/Throat:   Hearing grossly normal.         Neck: Supple. Chest:   Lungs clear to auscultation bilaterally. Cardiovascular:  Regular rate and rhythm, S1, S2 normal, no murmur. Abdomen:   Soft, non-tender, bowel sounds are active. Extremities: No edema bilaterally. Skin: Warm and dry.                  Current Outpatient Medications on File Prior to Visit   Medication Sig Dispense Refill    atorvastatin (LIPITOR) 10 mg tablet TAKE 1 TABLET DAILY 90 Tablet 0    oxybutynin chloride XL (DITROPAN XL) 5 mg CR tablet Take 5 mg by mouth daily. multivitamin with minerals (HAIR,SKIN AND NAILS PO) Take  by mouth daily. loratadine (CLARITIN) 10 mg tablet Take 10 mg by mouth daily. multivitamin (ONE A DAY) tablet Take 1 Tab by mouth daily. calcium citrate-vitamin D3 (CITRACAL WITH VITAMIN D MAXIMUM) tablet Take  by mouth daily. naproxen sodium 220 mg cap Take  by mouth as needed. moxifloxacin (VIGAMOX) 0.5 % ophthalmic solution INSTILL 1 DROP IN RIGHT EYE 4 TIMES A DAY FOR 7 DAYS THEN DISCONTINUE      prednisoLONE acetate (PRED FORTE) 1 % ophthalmic suspension PLEASE SEE ATTACHED FOR DETAILED DIRECTIONS       No current facility-administered medications on file prior to visit. Xochitl Norman had no medications administered during this visit. Current Outpatient Medications   Medication Sig    atorvastatin (LIPITOR) 10 mg tablet TAKE 1 TABLET DAILY    oxybutynin chloride XL (DITROPAN XL) 5 mg CR tablet Take 5 mg by mouth daily. multivitamin with minerals (HAIR,SKIN AND NAILS PO) Take  by mouth daily. loratadine (CLARITIN) 10 mg tablet Take 10 mg by mouth daily. multivitamin (ONE A DAY) tablet Take 1 Tab by mouth daily. calcium citrate-vitamin D3 (CITRACAL WITH VITAMIN D MAXIMUM) tablet Take  by mouth daily. naproxen sodium 220 mg cap Take  by mouth as needed. moxifloxacin (VIGAMOX) 0.5 % ophthalmic solution INSTILL 1 DROP IN RIGHT EYE 4 TIMES A DAY FOR 7 DAYS THEN DISCONTINUE    prednisoLONE acetate (PRED FORTE) 1 % ophthalmic suspension PLEASE SEE ATTACHED FOR DETAILED DIRECTIONS     No current facility-administered medications for this visit.          Lab Results   Component Value Date/Time    Cholesterol, total 135 03/14/2023 10:24 AM    HDL Cholesterol 49 03/14/2023 10:24 AM    LDL, calculated 68 03/14/2023 10:24 AM    LDL, calculated 65 03/05/2020 08:07 AM    VLDL, calculated 18 03/14/2023 10:24 AM    VLDL, calculated 14 03/05/2020 08:07 AM    Triglyceride 99 03/14/2023 10:24 AM       No results found for: NA, K, CL, CO2, AGAP, GLU, BUN, CREA, BUCR, GFRAA, GFRNA, CA, GFRAA    Lab Results   Component Value Date/Time    ALT (SGPT) 17 03/14/2023 10:24 AM    Alk. phosphatase 71 03/14/2023 10:24 AM    Bilirubin, direct 0.16 03/14/2023 10:24 AM    Bilirubin, total 0.5 03/14/2023 10:24 AM       No results found for: WBC, WBCLT, HGBPOC, HGB, HGBP, HCTPOC, HCT, PHCT, RBCH, PLT, MCV, HGBEXT, HCTEXT, PLTEXT    No results found for: TSH, TSH2, TSH3, TSHP, TSHEXT      Lab Results   Component Value Date/Time    Cholesterol, total 135 03/14/2023 10:24 AM    Cholesterol, total 159 12/14/2021 08:19 AM    Cholesterol, total 157 09/30/2020 08:32 AM    Cholesterol, total 135 03/05/2020 08:07 AM    Cholesterol, total 137 11/25/2019 08:46 AM    HDL Cholesterol 49 03/14/2023 10:24 AM    HDL Cholesterol 62 12/14/2021 08:19 AM    HDL Cholesterol 65 09/30/2020 08:32 AM    HDL Cholesterol 56 03/05/2020 08:07 AM    HDL Cholesterol 58 11/25/2019 08:46 AM    LDL, calculated 68 03/14/2023 10:24 AM    LDL, calculated 79 12/14/2021 08:19 AM    LDL, calculated 74 09/30/2020 08:32 AM    LDL, calculated 65 03/05/2020 08:07 AM    LDL, calculated 63 11/25/2019 08:46 AM    LDL, calculated 84 09/19/2019 07:58 AM    LDL, calculated 70 01/10/2019 08:06 AM    LDL, calculated 79 09/21/2018 08:09 AM    Triglyceride 99 03/14/2023 10:24 AM    Triglyceride 99 12/14/2021 08:19 AM    Triglyceride 99 09/30/2020 08:32 AM    Triglyceride 71 03/05/2020 08:07 AM    Triglyceride 80 11/25/2019 08:46 AM                Please note that this dictation was completed with [employer], the [employer] voice recognition software. Quite often unanticipated grammatical, syntax, homophones, and other interpretative errors are inadvertently transcribed by the computer software. Please disregard these errors. Please excuse any errors that have escaped final proofreading.

## 2023-03-21 NOTE — PROGRESS NOTES
Visit Vitals  /78   Pulse 72   Resp 18   Ht 5' 3\" (1.6 m)   Wt 146 lb (66.2 kg)   SpO2 97%   BMI 25.86 kg/m²

## 2023-05-17 RX ORDER — ATORVASTATIN CALCIUM 10 MG/1
TABLET, FILM COATED ORAL
Qty: 90 TABLET | Refills: 0 | Status: SHIPPED | OUTPATIENT
Start: 2023-05-17

## 2023-05-17 NOTE — TELEPHONE ENCOUNTER
Cardiologist: Dr. Smitha Olivera   Date Time Provider Noe Shahid   3/22/2024  1:40 PM MD JOCE Anderson BS AMB       Requested Prescriptions     Signed Prescriptions Disp Refills    atorvastatin (LIPITOR) 10 MG tablet 90 tablet 0     Sig: TAKE 1 TABLET DAILY     Authorizing Provider: Sofia Gamez     Ordering User: NIMCO LOO         Refills VO per Dr. Tonya Domingo.

## 2023-08-07 RX ORDER — ATORVASTATIN CALCIUM 10 MG/1
TABLET, FILM COATED ORAL
Qty: 90 TABLET | Refills: 3 | Status: SHIPPED | OUTPATIENT
Start: 2023-08-07

## 2023-08-07 NOTE — TELEPHONE ENCOUNTER
Requested Prescriptions     Signed Prescriptions Disp Refills    atorvastatin (LIPITOR) 10 MG tablet 90 tablet 3     Sig: TAKE 1 TABLET DAILY     Authorizing Provider: Zach Finney     Ordering User: Sb Salinas     Verbal order per Dr. Candy Bray.     Future Appointments   Date Time Provider 4600  46[facility]   3/22/2024  1:40 PM MD JOCE Aldridge AMB

## 2023-10-11 ENCOUNTER — HOSPITAL ENCOUNTER (OUTPATIENT)
Facility: HOSPITAL | Age: 78
Discharge: HOME | End: 2023-10-14
Attending: ORTHOPAEDIC SURGERY
Payer: MEDICARE

## 2023-10-11 DIAGNOSIS — S46.019D TRAUMATIC COMPLETE TEAR OF ROTATOR CUFF, UNSPECIFIED LATERALITY, SUBSEQUENT ENCOUNTER: ICD-10-CM

## 2023-10-11 PROCEDURE — 73221 MRI JOINT UPR EXTREM W/O DYE: CPT

## 2023-11-28 RX ORDER — SODIUM CHLORIDE 9 MG/ML
25 INJECTION, SOLUTION INTRAVENOUS PRN
Status: DISCONTINUED | OUTPATIENT
Start: 2023-11-28 | End: 2023-11-29 | Stop reason: HOSPADM

## 2023-11-28 RX ORDER — SODIUM CHLORIDE 0.9 % (FLUSH) 0.9 %
5-40 SYRINGE (ML) INJECTION EVERY 12 HOURS SCHEDULED
Status: DISCONTINUED | OUTPATIENT
Start: 2023-11-28 | End: 2023-11-29 | Stop reason: HOSPADM

## 2023-11-28 RX ORDER — SODIUM CHLORIDE 0.9 % (FLUSH) 0.9 %
5-40 SYRINGE (ML) INJECTION PRN
Status: DISCONTINUED | OUTPATIENT
Start: 2023-11-28 | End: 2023-11-29 | Stop reason: HOSPADM

## 2023-11-29 ENCOUNTER — ANESTHESIA (OUTPATIENT)
Facility: HOSPITAL | Age: 78
End: 2023-11-29
Payer: MEDICARE

## 2023-11-29 ENCOUNTER — ANESTHESIA EVENT (OUTPATIENT)
Facility: HOSPITAL | Age: 78
End: 2023-11-29
Payer: MEDICARE

## 2023-11-29 ENCOUNTER — HOSPITAL ENCOUNTER (OUTPATIENT)
Facility: HOSPITAL | Age: 78
Setting detail: OUTPATIENT SURGERY
Discharge: HOME | End: 2023-11-29
Attending: INTERNAL MEDICINE | Admitting: INTERNAL MEDICINE
Payer: MEDICARE

## 2023-11-29 VITALS
BODY MASS INDEX: 25.34 KG/M2 | SYSTOLIC BLOOD PRESSURE: 134 MMHG | TEMPERATURE: 98.6 F | RESPIRATION RATE: 24 BRPM | DIASTOLIC BLOOD PRESSURE: 63 MMHG | HEART RATE: 74 BPM | OXYGEN SATURATION: 99 % | WEIGHT: 143 LBS | HEIGHT: 63 IN

## 2023-11-29 PROCEDURE — 7100000010 HC PHASE II RECOVERY - FIRST 15 MIN: Performed by: INTERNAL MEDICINE

## 2023-11-29 PROCEDURE — 3700000001 HC ADD 15 MINUTES (ANESTHESIA): Performed by: INTERNAL MEDICINE

## 2023-11-29 PROCEDURE — 2580000003 HC RX 258: Performed by: INTERNAL MEDICINE

## 2023-11-29 PROCEDURE — 7100000011 HC PHASE II RECOVERY - ADDTL 15 MIN: Performed by: INTERNAL MEDICINE

## 2023-11-29 PROCEDURE — 3600007502: Performed by: INTERNAL MEDICINE

## 2023-11-29 PROCEDURE — 3600007512: Performed by: INTERNAL MEDICINE

## 2023-11-29 PROCEDURE — 2709999900 HC NON-CHARGEABLE SUPPLY: Performed by: INTERNAL MEDICINE

## 2023-11-29 PROCEDURE — 88305 TISSUE EXAM BY PATHOLOGIST: CPT

## 2023-11-29 PROCEDURE — 3700000000 HC ANESTHESIA ATTENDED CARE: Performed by: INTERNAL MEDICINE

## 2023-11-29 PROCEDURE — 2500000003 HC RX 250 WO HCPCS: Performed by: NURSE ANESTHETIST, CERTIFIED REGISTERED

## 2023-11-29 PROCEDURE — 6360000002 HC RX W HCPCS: Performed by: NURSE ANESTHETIST, CERTIFIED REGISTERED

## 2023-11-29 RX ORDER — LIDOCAINE HYDROCHLORIDE 20 MG/ML
INJECTION, SOLUTION EPIDURAL; INFILTRATION; INTRACAUDAL; PERINEURAL PRN
Status: DISCONTINUED | OUTPATIENT
Start: 2023-11-29 | End: 2023-11-29 | Stop reason: SDUPTHER

## 2023-11-29 RX ADMIN — LIDOCAINE HYDROCHLORIDE 100 MG: 20 INJECTION, SOLUTION EPIDURAL; INFILTRATION; INTRACAUDAL; PERINEURAL at 09:07

## 2023-11-29 RX ADMIN — PROPOFOL 50 MG: 10 INJECTION, EMULSION INTRAVENOUS at 09:16

## 2023-11-29 RX ADMIN — PROPOFOL 50 MG: 10 INJECTION, EMULSION INTRAVENOUS at 09:24

## 2023-11-29 RX ADMIN — SODIUM CHLORIDE: 9 INJECTION, SOLUTION INTRAVENOUS at 09:02

## 2023-11-29 RX ADMIN — PROPOFOL 100 MG: 10 INJECTION, EMULSION INTRAVENOUS at 09:07

## 2023-11-29 RX ADMIN — PROPOFOL 50 MG: 10 INJECTION, EMULSION INTRAVENOUS at 09:12

## 2023-11-29 ASSESSMENT — PAIN - FUNCTIONAL ASSESSMENT: PAIN_FUNCTIONAL_ASSESSMENT: 0-10

## 2023-11-29 NOTE — PROGRESS NOTES
Endoscopy Case End Note:    Procedure scope was pre-cleaned, per protocol, at bedside by Tad Heath. Report received from anesthesia. See anesthesia flowsheet for intra-procedure vital signs and events. Belongings returned to patient.

## 2023-11-29 NOTE — DISCHARGE INSTRUCTIONS
Roselle Sicard, MD  [facility], [address] [facility], [address] [address] Isaac Schofield  [phone]  www. [employer]    [facility]  [id number]  1945    COLON DISCHARGE INSTRUCTIONS  Discomfort:  Redness at IV site- apply warm compress to area; if redness or soreness persist- contact your physician  There may be a slight amount of blood passed from the rectum  Gaseous discomfort- walking, belching will help relieve any discomfort  You may not operate a vehicle for 12 hours  You may not engage in an occupation involving machinery or appliances for rest of today  You may not drink alcoholic beverages for at least 12 hours  Avoid making any critical decisions for at least 24 hour  DIET:   High fiber diet. - however -  remember your colon is empty and a heavy meal will produce gas. Avoid these foods:  vegetables, fried / greasy foods, carbonated drinks for today      ACTIVITY:  You may resume your normal daily activities it is recommended that you spend the remainder of the day resting -  avoid any strenuous activity. CALL M.D. ANY SIGN OF:   Increasing pain, nausea, vomiting  Abdominal distension (swelling)  New increased bleeding (oral or rectal)  Fever (chills)  Pain in chest area  Bloody discharge from nose or mouth  Shortness of breath     COLONOSCOPY FINDINGS:  Your colonoscopy showed: one small polyp was removed and diverticulosis and internal hemorrhoids. Follow-up Instructions:   Call Dr. Roselle Sicard if any questions or problems. Telephone # [phone]  Dr. Brooks Kinds office will notify you of the biopsy results within 7 to 10 days. Should have a repeat colonoscopy in 5 years.      Patient Education on Sedation / Analgesia Administered for Procedure      For 24 hours after general anesthesia or intravenous analgesia / sedation:  Have someone responsible help you with your care  Limit your activities  Do not drive and operate

## 2023-11-29 NOTE — OP NOTE
[address]                  Colonoscopy Operative Report    11/29/2023      Brett Quijano  [id number]  1945    Procedure Type:   Colonoscopy --screening     Indications:  FH of colon cancer    Pre-operative Diagnosis: see indication above    Post-operative Diagnosis:  See findings below    :  Sandra Rendon MD    Referring Provider: Clair Izquierdo MD      Sedation:  MAC anesthesia Propofol    Pre-Procedural Exam:      Airway: clear,  No airway problems anticipated  Heart: RRR, without gallops or rubs  Lungs: clear bilaterally without wheezes, crackles, or rhonchi  Abdomen: soft, nontender, nondistended, bowel sounds present  Mental Status: awake, alert and oriented to person, place and time     Procedure Details:  After informed consent was obtained with all risks and benefits of procedure explained and preoperative exam completed, the patient was taken to the endoscopy suite and placed in the left lateral decubitus position. Upon sequential sedation as per above, a digital rectal exam was performed . The Olympus videocolonoscope  was inserted in the rectum and carefully advanced to the cecum, which was identified by the ileocecal valve and appendiceal orifice. The cecum was identified by the ileocecal valve and appendiceal orifice. The quality of preparation was good. The colonoscope was slowly withdrawn with careful evaluation between folds. Retroflexion in the rectum was completed demonstrating internal hemorrhoids. Findings:   Rectum: Grade 1 internal hemorrhoid(s); Sigmoid:     -Diverticulosis  Descending Colon: normal  Transverse Colon: normal  Ascending Colon: normal  Cecum: 5 mm polyp, cold snared  Terminal Ileum: not intubated      Specimen Removed:   cecal polyp    Complications: None. EBL:  None.     Impression:    diverticulosis,  Mild in degree, involving the sigmoid  hemorrhoids internal, Moderate in size  Small cecal

## 2023-11-29 NOTE — ANESTHESIA POSTPROCEDURE EVALUATION
Department of Anesthesiology  Postprocedure Note    Patient: Oksana Ambrosio  MRN: [id number]  YOB: 1945  Date of evaluation: 11/29/2023      Procedure Summary       Date: 11/29/23 Room / Location: [facility] ENDO 01 / [facility] ENDOSCOPY    Anesthesia Start: 0902 Anesthesia Stop: 5383    Procedure: COLONOSCOPY/BIOPSY/POLYP Diagnosis:       Family history of malignant neoplasm of gastrointestinal tract      (Family history of malignant neoplasm of gastrointestinal tract [Z80.0])    Surgeons: Justyn Delarosa MD Responsible Provider: Severiano Castrejon MD    Anesthesia Type: MAC ASA Status: 2            Anesthesia Type: MAC    Richard Phase I: Richard Score: 10    Richard Phase II: Richrad Score: 10      Anesthesia Post Evaluation    Patient location during evaluation: PACU  Patient participation: complete - patient participated  Level of consciousness: sleepy but conscious and responsive to verbal stimuli  Pain score: 1  Airway patency: patent  Nausea & Vomiting: no vomiting and no nausea  Complications: no  Cardiovascular status: blood pressure returned to baseline and hemodynamically stable  Respiratory status: acceptable  Hydration status: stable  Multimodal analgesia pain management approach  Pain management: adequate

## 2024-01-24 ENCOUNTER — HOSPITAL ENCOUNTER (OUTPATIENT)
Age: 79
Discharge: HOME | End: 2024-01-27
Payer: MEDICARE

## 2024-01-24 VITALS — HEIGHT: 63 IN | BODY MASS INDEX: 25.16 KG/M2 | WEIGHT: 142 LBS

## 2024-01-24 DIAGNOSIS — N95.8 OTHER SPECIFIED MENOPAUSAL AND PERIMENOPAUSAL DISORDERS: ICD-10-CM

## 2024-01-24 DIAGNOSIS — Z12.31 SCREENING MAMMOGRAM FOR BREAST CANCER: ICD-10-CM

## 2024-01-24 DIAGNOSIS — Z13.820 SCREENING FOR OSTEOPOROSIS: ICD-10-CM

## 2024-01-24 PROCEDURE — 77063 BREAST TOMOSYNTHESIS BI: CPT

## 2024-01-24 PROCEDURE — 77080 DXA BONE DENSITY AXIAL: CPT

## 2024-02-24 LAB
ALBUMIN SERPL-MCNC: 4.4 G/DL (ref 3.8–4.8)
ALP SERPL-CCNC: 67 IU/L (ref 44–121)
ALT SERPL-CCNC: 23 IU/L (ref 0–32)
AST SERPL-CCNC: 26 IU/L (ref 0–40)
BILIRUB DIRECT SERPL-MCNC: 0.19 MG/DL (ref 0–0.4)
BILIRUB SERPL-MCNC: 0.6 MG/DL (ref 0–1.2)
CHOLEST SERPL-MCNC: 152 MG/DL (ref 100–199)
HDLC SERPL-MCNC: 60 MG/DL
IMP & REVIEW OF LAB RESULTS: NORMAL
LDLC SERPL CALC-MCNC: 72 MG/DL (ref 0–99)
PROT SERPL-MCNC: 7 G/DL (ref 6–8.5)
TRIGL SERPL-MCNC: 110 MG/DL (ref 0–149)
VLDLC SERPL CALC-MCNC: 20 MG/DL (ref 5–40)

## 2024-03-12 ENCOUNTER — TELEPHONE (OUTPATIENT)
Age: 79
End: 2024-03-12

## 2024-03-12 RX ORDER — ATORVASTATIN CALCIUM 10 MG/1
10 TABLET, FILM COATED ORAL DAILY
Qty: 90 TABLET | Refills: 3 | Status: SHIPPED | OUTPATIENT
Start: 2024-03-12

## 2024-03-12 NOTE — TELEPHONE ENCOUNTER
Cardiologist: Dr. Griffin    Future Appointments   Date Time Provider Department Center   6/5/2024  2:40 PM Jim Griffin MD [facility]       Requested Prescriptions     Signed Prescriptions Disp Refills    atorvastatin (LIPITOR) 10 MG tablet 90 tablet 3     Sig: Take 1 tablet by mouth daily     Authorizing Provider: JIM GRIFFIN     Ordering User: NIMCO LOO         Refills VO per Dr. Griffin.

## 2024-03-12 NOTE — TELEPHONE ENCOUNTER
Patient is calling because she needs a 90 day supply of Atorvastatin 10 mg.Pharmacy is confirm.Patient has Medicare  [employer] for her prescriptions.    [facility]  [phone] fax or call [phone] patient

## 2024-05-29 ENCOUNTER — HOSPITAL ENCOUNTER (OUTPATIENT)
Facility: HOSPITAL | Age: 79
Discharge: HOME | End: 2024-06-01
Attending: OTOLARYNGOLOGY
Payer: MEDICARE

## 2024-05-29 DIAGNOSIS — Z87.891 FORMER SMOKER: ICD-10-CM

## 2024-05-29 DIAGNOSIS — M26.609 TMJ (TEMPOROMANDIBULAR JOINT DISORDER): ICD-10-CM

## 2024-05-29 DIAGNOSIS — E04.1 THYROID NODULE: ICD-10-CM

## 2024-05-29 DIAGNOSIS — Z00.8 ENCOUNTER FOR OTHER GENERAL EXAMINATION: ICD-10-CM

## 2024-05-29 PROCEDURE — 76536 US EXAM OF HEAD AND NECK: CPT

## 2024-06-05 ENCOUNTER — OFFICE VISIT (OUTPATIENT)
Age: 79
End: 2024-06-05
Payer: MEDICARE

## 2024-06-05 VITALS
DIASTOLIC BLOOD PRESSURE: 78 MMHG | SYSTOLIC BLOOD PRESSURE: 130 MMHG | HEART RATE: 83 BPM | OXYGEN SATURATION: 98 % | BODY MASS INDEX: 25.34 KG/M2 | WEIGHT: 143 LBS | HEIGHT: 63 IN

## 2024-06-05 DIAGNOSIS — I25.119 ATHEROSCLEROSIS OF NATIVE CORONARY ARTERY WITH ANGINA PECTORIS, UNSPECIFIED WHETHER NATIVE OR TRANSPLANTED HEART (HCC): Primary | ICD-10-CM

## 2024-06-05 PROCEDURE — 93005 ELECTROCARDIOGRAM TRACING: CPT | Performed by: SPECIALIST

## 2024-06-05 PROCEDURE — 99214 OFFICE O/P EST MOD 30 MIN: CPT | Performed by: SPECIALIST

## 2024-06-05 PROCEDURE — 1090F PRES/ABSN URINE INCON ASSESS: CPT | Performed by: SPECIALIST

## 2024-06-05 PROCEDURE — 1036F TOBACCO NON-USER: CPT | Performed by: SPECIALIST

## 2024-06-05 PROCEDURE — 1123F ACP DISCUSS/DSCN MKR DOCD: CPT | Performed by: SPECIALIST

## 2024-06-05 PROCEDURE — 93010 ELECTROCARDIOGRAM REPORT: CPT | Performed by: SPECIALIST

## 2024-06-05 PROCEDURE — G8419 CALC BMI OUT NRM PARAM NOF/U: HCPCS | Performed by: SPECIALIST

## 2024-06-05 PROCEDURE — G8427 DOCREV CUR MEDS BY ELIG CLIN: HCPCS | Performed by: SPECIALIST

## 2024-06-05 PROCEDURE — G8399 PT W/DXA RESULTS DOCUMENT: HCPCS | Performed by: SPECIALIST

## 2024-06-05 ASSESSMENT — PATIENT HEALTH QUESTIONNAIRE - PHQ9
SUM OF ALL RESPONSES TO PHQ QUESTIONS 1-9: 0
1. LITTLE INTEREST OR PLEASURE IN DOING THINGS: NOT AT ALL
SUM OF ALL RESPONSES TO PHQ QUESTIONS 1-9: 0
SUM OF ALL RESPONSES TO PHQ QUESTIONS 1-9: 0
SUM OF ALL RESPONSES TO PHQ9 QUESTIONS 1 & 2: 0
SUM OF ALL RESPONSES TO PHQ QUESTIONS 1-9: 0
2. FEELING DOWN, DEPRESSED OR HOPELESS: NOT AT ALL

## 2024-06-05 NOTE — PROGRESS NOTES
135 03/14/2023 10:24 AM    HDL 60 02/23/2024 08:03 AM    LDL 72 02/23/2024 08:03 AM    VLDL 20 02/23/2024 08:03 AM    VLDL 18 03/14/2023 10:24 AM       No results found for: \"NA\", \"K\", \"CL\", \"CO2\", \"GLU\", \"BUN\", \"GFRAA\"    Lab Results   Component Value Date/Time    ALT 23 02/23/2024 08:03 AM       No results found for: \"WBC\", \"WBCLT\", \"HGBPOC\", \"HGB\", \"HGBP\", \"HCTPOC\", \"HCT\", \"PHCT\", \"PLT\", \"MCV\"    No results found for: \"TSH\", \"TSH2\", \"TSH3\"      Lab Results   Component Value Date/Time    CHOL 152 02/23/2024 08:03 AM    CHOL 135 03/14/2023 10:24 AM    CHOL 159 12/14/2021 08:19 AM    CHOL 157 09/30/2020 08:32 AM    CHOL 135 03/05/2020 08:07 AM    CHOL 137 11/25/2019 08:46 AM    HDL 60 02/23/2024 08:03 AM    HDL 49 03/14/2023 10:24 AM    HDL 62 12/14/2021 08:19 AM    HDL 65 09/30/2020 08:32 AM    HDL 56 03/05/2020 08:07 AM    LDL 72 02/23/2024 08:03 AM    LDL 68 03/14/2023 10:24 AM    LDL 79 12/14/2021 08:19 AM    LDL 74 09/30/2020 08:32 AM    LDL 65 03/05/2020 08:07 AM                Please note that this dictation was completed with [employer], the computer voice recognition software.  Quite often unanticipated grammatical, syntax, homophones, and other interpretative errors are inadvertently transcribed by the computer software.  Please disregard these errors.  Please excuse any errors that have escaped final proofreading.

## 2024-06-05 NOTE — PATIENT INSTRUCTIONS
Follow up in 6 months and have testing 1 week prior    You will be scheduled for a Nuclear Stress Test after your appointment today.    Nuclear stress testing evaluates blood flow to your heart muscle and assesses cardiac function. There are 2 parts (Rest/Stress) to this procedure and will include either an exercise on a treadmill or an IV administration of a stressing medication called Lexiscan. Your cardiologist will determine which type of testing is best for you. This test can be performed in one day unless it is determined that better quality images will be obtained by performing the test over two days.     *Please arrive 15 minutes prior to your appointment time    Test Duration:    -One day testing will take 4 hours   -Two day testing will take 2 hours each day. Your second day(resting images) will be scheduled after the first day is completed    Day of testing instructions:    NO CAFFEINE (not even decaffeinated products) 24 HOURS PRIOR TO TESTING. This includes coffee, soda, tea, chocolate, multivitamins, and migraine medication, like Excedrin or Fioricet that contains caffeine.  Nothing to eat or drink 4 HOURS prior to testing  NO NICOTINE 12 hours prior to testing  Hold any medications requested by your cardiologist. Otherwise take medications as directed with a few sips of water. If you are unsure you may bring your medications with you to take after instructed by your stressing nurse. It is recommended you hold NONE of your medications prior to your test. DIABETIC PATIENTS: Take half of your insulin with a light meal 4 hours before your test.  Wear comfortable clothes and shoes (Shirts with no metal, shorts or pants, tennis shoes, no heels or flip flops)    IMPORTANT: This testing involves a cardiac tracer ordered specifically for you. If you are unable to make your appointment, please call to cancel/reschedule AT LEAST 24 hours prior to your appointment so your tracer can be cancelled.

## 2024-12-02 ENCOUNTER — ANCILLARY PROCEDURE (OUTPATIENT)
Age: 79
End: 2024-12-02
Payer: MEDICARE

## 2024-12-02 VITALS
HEART RATE: 76 BPM | HEIGHT: 63 IN | SYSTOLIC BLOOD PRESSURE: 138 MMHG | BODY MASS INDEX: 25.34 KG/M2 | DIASTOLIC BLOOD PRESSURE: 88 MMHG | WEIGHT: 143 LBS

## 2024-12-02 DIAGNOSIS — I25.119 ATHEROSCLEROSIS OF NATIVE CORONARY ARTERY WITH ANGINA PECTORIS, UNSPECIFIED WHETHER NATIVE OR TRANSPLANTED HEART (HCC): ICD-10-CM

## 2024-12-02 PROCEDURE — A9500 TC99M SESTAMIBI: HCPCS | Performed by: SPECIALIST

## 2024-12-02 PROCEDURE — 78452 HT MUSCLE IMAGE SPECT MULT: CPT | Performed by: SPECIALIST

## 2024-12-02 RX ORDER — TETRAKIS(2-METHOXYISOBUTYLISOCYANIDE)COPPER(I) TETRAFLUOROBORATE 1 MG/ML
8.2 INJECTION, POWDER, LYOPHILIZED, FOR SOLUTION INTRAVENOUS
Status: COMPLETED | OUTPATIENT
Start: 2024-12-02 | End: 2024-12-02

## 2024-12-02 RX ORDER — TETRAKIS(2-METHOXYISOBUTYLISOCYANIDE)COPPER(I) TETRAFLUOROBORATE 1 MG/ML
24.6 INJECTION, POWDER, LYOPHILIZED, FOR SOLUTION INTRAVENOUS
Status: COMPLETED | OUTPATIENT
Start: 2024-12-02 | End: 2024-12-02

## 2024-12-02 RX ADMIN — TECHNETIUM TC-99M SESTAMIBI 8.2 MILLICURIE: 1 INJECTION INTRAVENOUS at 09:30

## 2024-12-02 RX ADMIN — TECHNETIUM TC-99M SESTAMIBI 24.6 MILLICURIE: 1 INJECTION INTRAVENOUS at 10:50

## 2024-12-03 LAB
ECHO BSA: 1.7 M2
NUC STRESS EJECTION FRACTION: 87 %
STRESS ANGINA INDEX: 0
STRESS BASELINE DIAS BP: 88 MMHG
STRESS BASELINE HR: 75 BPM
STRESS BASELINE SYS BP: 138 MMHG
STRESS ESTIMATED WORKLOAD: 4.6 METS
STRESS EXERCISE DUR MIN: 3 MIN
STRESS EXERCISE DUR SEC: 1 SEC
STRESS O2 SAT PEAK: 99 %
STRESS O2 SAT REST: 97 %
STRESS PEAK DIAS BP: 82 MMHG
STRESS PEAK SYS BP: 198 MMHG
STRESS PERCENT HR ACHIEVED: 96 %
STRESS POST PEAK HR: 137 BPM
STRESS RATE PRESSURE PRODUCT: NORMAL BPM*MMHG
STRESS TARGET HR: 143 BPM
TID: 1.06

## 2024-12-03 PROCEDURE — 93016 CV STRESS TEST SUPVJ ONLY: CPT | Performed by: SPECIALIST

## 2024-12-03 PROCEDURE — PBSHW PBB SHADOW CHARGE: Performed by: SPECIALIST

## 2024-12-03 PROCEDURE — 93018 CV STRESS TEST I&R ONLY: CPT | Performed by: SPECIALIST

## 2024-12-03 PROCEDURE — 78452 HT MUSCLE IMAGE SPECT MULT: CPT | Performed by: SPECIALIST

## 2024-12-06 ENCOUNTER — OFFICE VISIT (OUTPATIENT)
Age: 79
End: 2024-12-06
Payer: MEDICARE

## 2024-12-06 VITALS
RESPIRATION RATE: 16 BRPM | SYSTOLIC BLOOD PRESSURE: 120 MMHG | HEART RATE: 64 BPM | DIASTOLIC BLOOD PRESSURE: 70 MMHG | WEIGHT: 143 LBS | BODY MASS INDEX: 25.34 KG/M2 | HEIGHT: 63 IN | OXYGEN SATURATION: 97 %

## 2024-12-06 DIAGNOSIS — I25.119 ATHEROSCLEROSIS OF NATIVE CORONARY ARTERY WITH ANGINA PECTORIS, UNSPECIFIED WHETHER NATIVE OR TRANSPLANTED HEART (HCC): Primary | ICD-10-CM

## 2024-12-06 PROCEDURE — 1159F MED LIST DOCD IN RCRD: CPT | Performed by: SPECIALIST

## 2024-12-06 PROCEDURE — G8427 DOCREV CUR MEDS BY ELIG CLIN: HCPCS | Performed by: SPECIALIST

## 2024-12-06 PROCEDURE — 1160F RVW MEDS BY RX/DR IN RCRD: CPT | Performed by: SPECIALIST

## 2024-12-06 PROCEDURE — 1123F ACP DISCUSS/DSCN MKR DOCD: CPT | Performed by: SPECIALIST

## 2024-12-06 PROCEDURE — G8484 FLU IMMUNIZE NO ADMIN: HCPCS | Performed by: SPECIALIST

## 2024-12-06 PROCEDURE — 1090F PRES/ABSN URINE INCON ASSESS: CPT | Performed by: SPECIALIST

## 2024-12-06 PROCEDURE — G8419 CALC BMI OUT NRM PARAM NOF/U: HCPCS | Performed by: SPECIALIST

## 2024-12-06 PROCEDURE — G8399 PT W/DXA RESULTS DOCUMENT: HCPCS | Performed by: SPECIALIST

## 2024-12-06 PROCEDURE — 99214 OFFICE O/P EST MOD 30 MIN: CPT | Performed by: SPECIALIST

## 2024-12-06 PROCEDURE — 93010 ELECTROCARDIOGRAM REPORT: CPT | Performed by: SPECIALIST

## 2024-12-06 PROCEDURE — 1126F AMNT PAIN NOTED NONE PRSNT: CPT | Performed by: SPECIALIST

## 2024-12-06 PROCEDURE — 1036F TOBACCO NON-USER: CPT | Performed by: SPECIALIST

## 2024-12-06 PROCEDURE — 93005 ELECTROCARDIOGRAM TRACING: CPT | Performed by: SPECIALIST

## 2024-12-06 RX ORDER — BIOTIN 10 MG
1 TABLET ORAL
COMMUNITY

## 2024-12-06 ASSESSMENT — PATIENT HEALTH QUESTIONNAIRE - PHQ9
SUM OF ALL RESPONSES TO PHQ QUESTIONS 1-9: 0
2. FEELING DOWN, DEPRESSED OR HOPELESS: NOT AT ALL
1. LITTLE INTEREST OR PLEASURE IN DOING THINGS: NOT AT ALL
SUM OF ALL RESPONSES TO PHQ QUESTIONS 1-9: 0
SUM OF ALL RESPONSES TO PHQ9 QUESTIONS 1 & 2: 0

## 2025-01-13 ENCOUNTER — TRANSCRIBE ORDERS (OUTPATIENT)
Facility: HOSPITAL | Age: 80
End: 2025-01-13

## 2025-01-13 DIAGNOSIS — Z12.31 VISIT FOR SCREENING MAMMOGRAM: Primary | ICD-10-CM

## 2025-01-29 ENCOUNTER — HOSPITAL ENCOUNTER (OUTPATIENT)
Age: 80
Discharge: HOME | End: 2025-02-01
Payer: MEDICARE

## 2025-01-29 VITALS — HEIGHT: 63 IN | BODY MASS INDEX: 24.98 KG/M2 | WEIGHT: 141 LBS

## 2025-01-29 DIAGNOSIS — Z12.31 VISIT FOR SCREENING MAMMOGRAM: ICD-10-CM

## 2025-01-29 PROCEDURE — 77063 BREAST TOMOSYNTHESIS BI: CPT

## 2025-02-18 RX ORDER — ATORVASTATIN CALCIUM 10 MG/1
10 TABLET, FILM COATED ORAL DAILY
Qty: 90 TABLET | Refills: 0 | Status: SHIPPED | OUTPATIENT
Start: 2025-02-18

## 2025-02-18 NOTE — TELEPHONE ENCOUNTER
Cardiologist: Dr. Griffin    Future Appointments   Date Time Provider Department Center   9/8/2025 11:40 AM Jim Griffin MD [facility]       Requested Prescriptions     Signed Prescriptions Disp Refills    atorvastatin (LIPITOR) 10 MG tablet 90 tablet 0     Sig: TAKE 1 TABLET DAILY     Authorizing Provider: JIM GRIFFIN     Ordering User: NIMCO LOO         Refills VO per Dr. Oro.

## 2025-05-19 RX ORDER — ATORVASTATIN CALCIUM 10 MG/1
10 TABLET, FILM COATED ORAL DAILY
Qty: 90 TABLET | Refills: 1 | Status: SHIPPED | OUTPATIENT
Start: 2025-05-19

## 2025-05-19 NOTE — TELEPHONE ENCOUNTER
Requested Prescriptions     Signed Prescriptions Disp Refills    atorvastatin (LIPITOR) 10 MG tablet 90 tablet 1     Sig: TAKE 1 TABLET DAILY     Authorizing Provider: JIM GRIFFIN     Ordering User: MATILDA BARCENAS     Verbal order per Dr. Griffin.     Future Appointments   Date Time Provider Department Center   9/8/2025 11:40 AM Jim Griffin MD [facility]

## 2025-08-25 ENCOUNTER — TELEPHONE (OUTPATIENT)
Age: 80
End: 2025-08-25

## (undated) DEVICE — SYR 10ML LUER LOK 1/5ML GRAD --

## (undated) DEVICE — SOLIDIFIER MEDC 1200ML -- CONVERT TO 356117

## (undated) DEVICE — NON-REM POLYHESIVE PATIENT RETURN ELECTRODE: Brand: VALLEYLAB

## (undated) DEVICE — TOWEL 4 PLY TISS 19X30 SUE WHT

## (undated) DEVICE — SOLUTION IRRIG 500ML STRL H2O NONPYROGENIC

## (undated) DEVICE — GLOVE SURG SZ 65 THK91MIL LTX FREE SYN POLYISOPRENE

## (undated) DEVICE — NEEDLE HYPO 30GA L0.5IN BGE POLYPR HUB S STL REG BVL STR

## (undated) DEVICE — CONTAINER SPEC 20 ML LID NEUT BUFF FORMALIN 10 % POLYPR STS

## (undated) DEVICE — NEONATAL-ADULT SPO2 SENSOR: Brand: NELLCOR

## (undated) DEVICE — SLIT FULL HDL-2.8MM ANG C-CUT: Brand: SHARPOINT

## (undated) DEVICE — Device

## (undated) DEVICE — HYPODERMIC SAFETY NEEDLE: Brand: MONOJECT

## (undated) DEVICE — Z DISCONTINUED PER MEDLINE LINE GAS SAMPLING O2/CO2 LNG AD 13 FT NSL W/ TBNG FILTERLINE

## (undated) DEVICE — SNARE ENDOSCP L240CM LOOP W27MM SHTH DIA2.4MM WRK CHN 2.8MM

## (undated) DEVICE — SOLUTION IV 250ML 0.9% SOD CHL CLR INJ FLX BG CONT PRT CLSR

## (undated) DEVICE — 1200 GUARD II KIT W/5MM TUBE W/O VAC TUBE: Brand: GUARDIAN

## (undated) DEVICE — STRAINER URIN CALC RNL MSH -- CONVERT TO ITEM 357634

## (undated) DEVICE — PACK CATRCT CUST AS835752] ALCON LABORATORIES INC]

## (undated) DEVICE — TRAP SUC MUCOUS 70ML -- MEDICHOICE MEDLINE

## (undated) DEVICE — BASIN EMSIS 16OZ GRAPHITE PLAS KID SHP MOLD GRAD FOR ORAL

## (undated) DEVICE — ADULT SPO2 SENSOR: Brand: NELLCOR

## (undated) DEVICE — SNARE ENDOSCP M L240CM W27MM SHTH DIA2.4MM CHN 2.8MM OVL

## (undated) DEVICE — CATH IV AUTOGRD BC PNK 20GA 25 -- INSYTE

## (undated) DEVICE — KENDALL RADIOLUCENT FOAM MONITORING ELECTRODE RECTANGULAR SHAPE: Brand: KENDALL

## (undated) DEVICE — KENDALL DL 5 LEADS DUAL CONNECT SYSTEM BLENDED CASE - SINGLE-PATIENT-USE: Brand: SUSTAINABLE TECHNOLOGIES

## (undated) DEVICE — SYR 3ML LL TIP 1/10ML GRAD --

## (undated) DEVICE — NEEDLE HYPO 18GA L1.5IN PNK S STL HUB POLYPR SHLD REG BVL

## (undated) DEVICE — SET ADMIN 16ML TBNG L100IN 2 Y INJ SITE IV PIGGY BK DISP

## (undated) DEVICE — SYR LR LCK 1ML GRAD NSAF 30ML --

## (undated) DEVICE — CATHETER ETER IV 22GA L1IN POLYUR STR RADPQ INTROCAN SFTY